# Patient Record
Sex: FEMALE | Race: WHITE
[De-identification: names, ages, dates, MRNs, and addresses within clinical notes are randomized per-mention and may not be internally consistent; named-entity substitution may affect disease eponyms.]

---

## 2017-07-05 ENCOUNTER — HOSPITAL ENCOUNTER (EMERGENCY)
Dept: HOSPITAL 74 - JERFT | Age: 57
Discharge: HOME | End: 2017-07-05
Payer: COMMERCIAL

## 2017-07-05 VITALS — HEART RATE: 73 BPM | TEMPERATURE: 98 F | DIASTOLIC BLOOD PRESSURE: 102 MMHG | SYSTOLIC BLOOD PRESSURE: 159 MMHG

## 2017-07-05 VITALS — BODY MASS INDEX: 29.6 KG/M2

## 2017-07-05 DIAGNOSIS — I10: ICD-10-CM

## 2017-07-05 DIAGNOSIS — J06.9: Primary | ICD-10-CM

## 2017-07-05 DIAGNOSIS — S92.534A: ICD-10-CM

## 2017-07-05 DIAGNOSIS — K58.9: ICD-10-CM

## 2017-07-05 PROCEDURE — 2W3UXYZ IMMOBILIZATION OF RIGHT TOE USING OTHER DEVICE: ICD-10-PCS

## 2017-07-05 NOTE — PDOC
History of Present Illness





- General


Chief Complaint: Sore Throat


Stated Complaint: THROAT PAIN, TOE PAIN


Time Seen by Provider: 07/05/17 12:13


History Source: Patient


Exam Limitations: No Limitations





- History of Present Illness


Initial Comments: 





07/05/17 13:21


CC SORE THROAT AND FRACTURED RIGHT SMALL TOE


Severity: mild


Associated Symptoms: reports: denies symptoms.  denies: cough





Past History





- Past Medical History


Allergies/Adverse Reactions: 


 Allergies











Allergy/AdvReac Type Severity Reaction Status Date / Time


 


linaclotide [From Linzess] AdvReac Intermediate diarrhea Unverified 07/05/17 12:

05











Home Medications: 


Ambulatory Orders





Amlodipine Besylate [Norvasc -] 10 mg PO DAILY 01/28/14 


Fluticasone Prop 0.05% Nasal [Flonase -] 1 spray NS BID #1 spray.pump 01/09/15 


Albuterol Sulfate Inhaler - [Ventolin HFA Inhaler -] 2 inh PO Q4H #1 inh NS 01/

26/15 


Azithromycin [Zithromax Z-KRISTA (5 DAYS) -] 250 mg PO ASDIR #6 tablet NS 01/26/15 


Salmeterol/Fluticasone [Advair 100Mcg/50Mcg -] 1 inh PO BID #1 diskus NS 01/26/

15 


Cholecalciferol (Vitamin D3) [Vitamin D3] 1,000 unit PO DAILY  capsule 05/22/15 








GI Disorders: Yes (IBS)


HTN: Yes





- Surgical History


Abdominal Surgery: Yes





- Psycho/Social/Smoking Cessation Hx


Anxiety: No


Suicidal Ideation: No


Smoking History: Never smoked


Hx Alcohol Use: Yes (SOCIAL)


Drug/Substance Use Hx: No


Substance Use Type: None





**Review of Systems





- Review of Systems


Constitutional: Yes: Fever, Malaise.  No: Symptoms Reported, Chills


HEENTM: Yes: Symptoms Reported, Nose Congestion, Throat Pain, Throat Swelling


Respiratory: No: Symptoms reported, Cough, Wheezing


Cardiac (ROS): No: Chest Pain


Musculoskeletal: Yes: Other (TENDER SMALL RIGHT TOE)





*Physical Exam





- Vital Signs


 Last Vital Signs











Temp Pulse Resp BP Pulse Ox


 


 98.0 F   73   20   159/102   97 


 


 07/05/17 12:02  07/05/17 12:02  07/05/17 12:02  07/05/17 12:02  07/05/17 12:02














- Physical Exam


General Appearance: Yes: Appropriately Dressed.  No: Apparent Distress


HEENT: positive: TMs Normal, Other (RED TROAT WITH POST NASAL DRIP)


Neck: positive: Supple, Lymphadenopathy (R), Lymphadenopathy (L).  negative: 

Rigid, Rigidity


Respiratory/Chest: positive: Lungs Clear.  negative: Chest Tender, Labored 

Respiration, Stridor, Wheezing


Musculoskeletal: positive: Other (TENDER RIGHT SMALL TOE WITH ECCHYMOSIS; NO 

GROSS DEFORMITY)


Integumentary: positive: Other (NO SKIN BREAKS TO TOE).  negative: Normal Color

, Dry, Warm





ED Treatment Course





- ADDITIONAL ORDERS


Additional order review: 














 07/05/17 12:07 Group A Strep Rapid Antigen - Final





 Throat 














Medical Decision Making





- Medical Decision Making





07/05/17 13:24


CHERELLE SPLINTED RIGHT SMALL TOE;; STREP= NEGATIVE





*DC/Admit/Observation/Transfer


Diagnosis at time of Disposition: 


 Viral upper respiratory tract infection





Toe fracture, right


Qualifiers:


 Encounter type: initial encounter Toe: lesser toe Fracture type: closed Phalanx

: distal Fracture alignment: nondisplaced Qualified Code(s): S92.534A - 

Nondisplaced fracture of distal phalanx of right lesser toe(s), initial 

encounter for closed fracture





- Discharge Dispostion


Disposition: HOME


Condition at time of disposition: Stable


Admit: No





- Patient Instructions


Additional Instructions: 


WEAR CHERELLE SPLINT; ADVIL FOR FEVER

## 2018-01-03 ENCOUNTER — HOSPITAL ENCOUNTER (OUTPATIENT)
Dept: HOSPITAL 74 - FASU-ENDO | Age: 58
Discharge: HOME | End: 2018-01-03
Attending: INTERNAL MEDICINE
Payer: COMMERCIAL

## 2018-01-03 VITALS — HEART RATE: 63 BPM | DIASTOLIC BLOOD PRESSURE: 74 MMHG | SYSTOLIC BLOOD PRESSURE: 104 MMHG

## 2018-01-03 VITALS — TEMPERATURE: 97.1 F

## 2018-01-03 VITALS — BODY MASS INDEX: 32.6 KG/M2

## 2018-01-03 DIAGNOSIS — Z86.010: Primary | ICD-10-CM

## 2018-01-03 DIAGNOSIS — K64.1: ICD-10-CM

## 2018-01-03 DIAGNOSIS — D12.2: ICD-10-CM

## 2018-01-03 DIAGNOSIS — K63.5: ICD-10-CM

## 2018-01-03 DIAGNOSIS — K57.30: ICD-10-CM

## 2018-01-03 PROCEDURE — 0DBL8ZX EXCISION OF TRANSVERSE COLON, VIA NATURAL OR ARTIFICIAL OPENING ENDOSCOPIC, DIAGNOSTIC: ICD-10-PCS | Performed by: INTERNAL MEDICINE

## 2018-01-03 PROCEDURE — 0DBK8ZX EXCISION OF ASCENDING COLON, VIA NATURAL OR ARTIFICIAL OPENING ENDOSCOPIC, DIAGNOSTIC: ICD-10-PCS | Performed by: INTERNAL MEDICINE

## 2018-01-05 NOTE — PATH
Surgical Pathology Report



Patient Name:  BRITTANI AJ

Accession #:  [id number]

Med. Rec. #:  [id number]                                                        

   /Age/Gender:  1960 (Age: 57) / F

Account:  [id number]                                                          

             Location: [facility]-ENDOSCOPY

Taken:  1/3/2018

Received:  1/3/2018

Reported:  2018

Physicians:  Madison Gomez M.D.

  



Specimen(s) Received

A: BX ASCENDING COLON 

B: BX SIGMOID 





Clinical History

Preoperative diagnosis: Rule out colon cancer, history of polyps

Postoperative diagnosis: Polyps







Final Diagnosis

A. ASCENDING COLON, BIOPSY:

TUBULAR ADENOMA.



B. SIGMOID COLON, BIOPSY:

HYPERPLASTIC POLYP.







***Electronically Signed***

Madison Mayers M.D.





Gross Description

A.  Received in formalin, labeled "ascending" is a tan, irregular portion of

soft tissue measuring 0.4 cm. in greatest dimension. The specimen is submitted

in toto in one cassette.



B.  Received in formalin, labeled "sigmoid" is a tan, irregular portion of soft

tissue measuring 0.5 cm. in greatest dimension. The specimen is submitted in

toto in one cassette.

/2018



saudi

## 2018-09-09 ENCOUNTER — HOSPITAL ENCOUNTER (INPATIENT)
Dept: HOSPITAL 74 - FER | Age: 58
LOS: 6 days | Discharge: HOME | DRG: 392 | End: 2018-09-15
Attending: NURSE PRACTITIONER | Admitting: INTERNAL MEDICINE
Payer: COMMERCIAL

## 2018-09-09 VITALS — BODY MASS INDEX: 32.8 KG/M2

## 2018-09-09 DIAGNOSIS — I10: ICD-10-CM

## 2018-09-09 DIAGNOSIS — D72.829: ICD-10-CM

## 2018-09-09 DIAGNOSIS — K57.90: ICD-10-CM

## 2018-09-09 DIAGNOSIS — K21.9: ICD-10-CM

## 2018-09-09 DIAGNOSIS — K58.9: ICD-10-CM

## 2018-09-09 DIAGNOSIS — R10.32: ICD-10-CM

## 2018-09-09 DIAGNOSIS — K52.9: Primary | ICD-10-CM

## 2018-09-09 DIAGNOSIS — J90: ICD-10-CM

## 2018-09-09 DIAGNOSIS — E78.00: ICD-10-CM

## 2018-09-09 DIAGNOSIS — R11.2: ICD-10-CM

## 2018-09-09 LAB
ALBUMIN SERPL-MCNC: 4.7 G/DL (ref 3.5–5)
ALP SERPL-CCNC: 61 U/L (ref 32–92)
ALT SERPL-CCNC: 23 U/L (ref 10–40)
ANION GAP SERPL CALC-SCNC: 9 MMOL/L (ref 8–16)
AST SERPL-CCNC: 28 U/L (ref 10–42)
BASOPHILS # BLD: 4.6 % (ref 0–2)
BILIRUB SERPL-MCNC: 1.2 MG/DL (ref 0.2–1)
BUN SERPL-MCNC: 18 MG/DL (ref 7–18)
CALCIUM SERPL-MCNC: 9.5 MG/DL (ref 8.4–10.2)
CHLORIDE SERPL-SCNC: 101 MMOL/L (ref 98–107)
CO2 SERPL-SCNC: 26 MMOL/L (ref 22–28)
CREAT SERPL-MCNC: 1 MG/DL (ref 0.6–1.3)
DEPRECATED RDW RBC AUTO: 11.9 % (ref 11.6–15.6)
EOSINOPHIL # BLD: 0.9 % (ref 0–4.5)
GLUCOSE SERPL-MCNC: 102 MG/DL (ref 74–106)
HCT VFR BLD CALC: 41.3 % (ref 32.4–45.2)
HGB BLD-MCNC: 13.7 GM/DL (ref 10.7–15.3)
LIPASE SERPL-CCNC: 77 U/L (ref 73–393)
LYMPHOCYTES # BLD: 10 % (ref 8–40)
MCH RBC QN AUTO: 30.4 PG (ref 25.7–33.7)
MCHC RBC AUTO-ENTMCNC: 33.3 G/DL (ref 32–36)
MCV RBC: 91.4 FL (ref 80–96)
MONOCYTES # BLD AUTO: 6.8 % (ref 3.8–10.2)
NEUTROPHILS # BLD: 77.7 % (ref 42.8–82.8)
PLATELET # BLD AUTO: 268 K/MM3 (ref 134–434)
PMV BLD: 7.8 FL (ref 7.5–11.1)
POTASSIUM SERPLBLD-SCNC: 3.6 MMOL/L (ref 3.5–5.1)
PROT SERPL-MCNC: 7.3 G/DL (ref 6.4–8.3)
RBC # BLD AUTO: 4.52 M/MM3 (ref 3.6–5.2)
SODIUM SERPL-SCNC: 136 MMOL/L (ref 136–145)
WBC # BLD AUTO: 16.1 K/MM3 (ref 4–10.8)

## 2018-09-09 RX ADMIN — HYDROMORPHONE HYDROCHLORIDE PRN MG: 2 INJECTION, SOLUTION INTRAMUSCULAR; INTRAVENOUS; SUBCUTANEOUS at 21:33

## 2018-09-09 NOTE — HP
CHIEF COMPLAINT: Abd pain





PCP: Sourav GI: Patricia





HISTORY OF PRESENT ILLNESS:


This is a 58 year old female with a past medical history of diverticulosis and 

HTN who presented to the ED with LLQ pain. Pt reports the pain began yesterday 

after dinner. She had sudden onset of N/V/D associated with the pain. She 

reports hematochezia x 1 episode. Last colonoscopy 2018.





ER course was notable for:


(1) WBC 16.1


(2) CT c/w infectious colitis





Recent Travel:


pt denies





PAST MEDICAL HISTORY:


diverticulosis


HTN


PAST SURGICAL HISTORY:


 x 2





Social History:


Smoking: pt denies


Alcohol: pt denies


Drugs: pt denies





Family History:


mother alive, h/o BrCA, OA


father  age 83, prostate CA, h/o CABG


brother alive with CAD, HLD, HTN





Allergies


linaclotide [From Linzess] Adverse Reaction (Intermediate, Verified 18 15:

32)


 diarrhea





HOME MEDICATIONS:


 3





 Medication  Instructions  Recorded


 


Esomeprazole Magnesium [Nexium 20 mg PO DAILY 18





24Hr]  


 


Ramipril 5 mg PO daily


10 mg PO HS 09/10/18


 


hydrochlorothiazide 12.5 mg PO daily 








REVIEW OF SYSTEMS


CONSTITUTIONAL: 


Absent:  fever, chills, diaphoresis, generalized weakness, malaise, loss of 

appetite, weight change


HEENT: 


Absent:  rhinorrhea, nasal congestion, throat pain, throat swelling, difficulty 

swallowing, mouth swelling, ear pain, eye pain, visual changes


CARDIOVASCULAR: 


Absent: chest pain, syncope, palpitations, irregular heart rate, lightheadedness

, peripheral edema


RESPIRATORY: 


Absent: cough, shortness of breath, dyspnea with exertion, orthopnea, wheezing, 

stridor, hemoptysis


GASTROINTESTINAL: Present: abdominal pain, nausea, vomiting, diarrhea


Absent: abdominal distension, constipation, melena, hematochezia


GENITOURINARY: 


Absent: dysuria, frequency, urgency, hesitancy, hematuria, flank pain, genital 

pain


MUSCULOSKELETAL: 


Absent: myalgia, arthralgia, joint swelling, back pain, neck pain


SKIN: 


Absent: rash, itching, pallor


HEMATOLOGIC/IMMUNOLOGIC: 


Absent: easy bleeding, easy bruising, lymphadenopathy, frequent infections


ENDOCRINE:


Absent: unexplained weight gain, unexplained weight loss, heat intolerance, 

cold intolerance


NEUROLOGIC: 


Absent: headache, focal weakness or paresthesias, dizziness, unsteady gait, 

seizure, mental status changes, bladder or bowel incontinence


PSYCHIATRIC: 


Absent: anxiety, depression, suicidal or homicidal ideation, hallucinations.








PHYSICAL EXAMINATION


Vital Signs - 24 hr





 3





  18





  13:41 17:19 19:36


 


Temperature 98.8 F 98.1 F 98.9 F


 


Pulse Rate 94 H  87


 


Pulse Rate [  86 





Right]   


 


Respiratory 20 20 18





Rate   


 


Blood Pressure 141/84  126/83


 


Blood Pressure  129/80 





[Left Arm]   


 


O2 Sat by Pulse 100 97 97





Oximetry (%)   








GENERAL: Awake, alert, and fully oriented, in no acute distress.


HEAD: Normal with no signs of trauma.


EYES: Pupils equal, round and reactive to light, extraocular movements intact, 

sclera anicteric, conjunctiva clear. No lid lag.


EARS, NOSE, THROAT: Ears normal, nares patent, oropharynx clear without 

exudates. Moist mucous membranes.


NECK: Normal range of motion, supple without lymphadenopathy, JVD, or masses.


LUNGS: Breath sounds equal, clear to auscultation bilaterally. No wheezes, and 

no crackles. No accessory muscle use.


HEART: Regular rate and rhythm, normal S1 and S2 without murmur, rub or gallop.


ABDOMEN: Soft, Tender LLQ, LUQ, not distended, normoactive bowel sounds, no 

guarding, no rebound, no masses.  No hepatomegaly or  splenomegaly. 


MUSCULOSKELETAL: Normal range of motion at all joints. No bony deformities or 

tenderness. No CVA tenderness.


UPPER EXTREMITIES: 2+ pulses, warm, well-perfused. No cyanosis. No clubbing. No 

peripheral edema.


LOWER EXTREMITIES: 2+ pulses, warm, well-perfused. No calf tenderness. No 

peripheral edema. 


NEUROLOGICAL:  Cranial nerves II-XII intact. Normal speech. Normal gait.


PSYCHIATRIC: Cooperative. Good eye contact. Appropriate mood and affect.


SKIN: Warm, dry, normal turgor, no rashes or lesions noted, normal capillary 

refill. 





Laboratory Results - last 24 hr





 3





  18





  14:46 14:46 19:10


 


WBC  16.1 H  


 


RBC  4.52  


 


Hgb  13.7  


 


Hct  41.3  


 


MCV  91.4  


 


MCH  30.4  


 


MCHC  33.3  


 


RDW  11.9  


 


Plt Count  268  


 


MPV  7.8  


 


Absolute Neuts (auto)  12.6  


 


Neutrophils %  77.7  


 


Lymphocytes %  10.0  


 


Monocytes %  6.8  


 


Eosinophils %  0.9  


 


Basophils %  4.6 H  


 


Sodium   136 


 


Potassium   3.6 


 


Chloride   101 


 


Carbon Dioxide   26 


 


Anion Gap   9 


 


BUN   18 


 


Creatinine   1.0 


 


Creat Clearance w eGFR   57.35 


 


Random Glucose   102 


 


Lactic Acid    1.5


 


Calcium   9.5 


 


Total Bilirubin   1.2 H 


 


AST   28  D 


 


ALT   23  D 


 


Alkaline Phosphatase   61 


 


Total Protein   7.3 


 


Albumin   4.7 


 


Lipase   77 








Radiology Reports


CT abd/pelvis


THIS IS A PRELIMINARY REPORT FROM IMAGING ON CALL


FINDINGS:


Lower lung fields are clear. There are no gallstones identified. Liver is fatty 

in density without mass


or biliary dilatation.


The pancrease, spleen, adrenal glands are within normal. There are no renal 

stones, mass or


obstructive uropathy.


Abdominal aorta without AAA or dissection. IVC patent. There is no 

retroperitoneal hemorrhage or


pathologic adenopathy.


The appendix is normal . Transmural bowel edema with subtle pericolonic 

inflammatory fatty


stranding involves the descending colon from the splenic flexure to the 

sigmoid. Extensive sigmoid


diverticulosis noted. .No evidence of bowel obstruction, ascites, abscess, free 

air Bladder and


uterus grossly within normal


There is no pelvic mass or pathologic adenopathy. Spine and bony pelvis without 

fracture or


suspicious lesion.


IMPRESSION:


Left colitis without abscess or bowel obstruction.


Appendix normal.


THIS DOCUMENT HAS BEEN ELECTRONICALLY SIGNED


Skyla Reis D.O.


2018 17:36 EST








ASSESSMENT/PLAN:


58yF with PMH diverticulosis and HTN presented to the ED with LLQ pain, N/V/D.





Colitis


   - levaquin 750 daily


   - flagyl 500 q8h


   - GI consult appreciated


   - stool for culture, c diff


   - NPO for now given tenderness, advance diet as tolerated


   - NS @100cc/hr





HTN   


   - bp stable, resume meds as soon as diet initiated or sooner if BP elevated





DVT PPX


   - deferred given reported episode hematochezia





FEN


   - NS @ 100cc/hr


   - BMP in am


   - NPO for now





Dispo: pt currently requires further inpatient management of her emergent 

condition.








Visit type





- Emergency Visit


Emergency Visit: Yes


ED Registration Date: 18


Care time: The patient presented to the Emergency Department on the above date 

and was hospitalized for further evaluation of their emergent condition.





- New Patient


This patient is new to me today: Yes


Date on this admission: 18





- Critical Care


Critical Care patient: No





Hospitalist Screening





- Colonoscopy Questionnaire


Colonoscopy Questionnaire: 





Colonoscopy Questionnaire








-   Patient:


50 - 75 years old and never had a screening colonoscopy: No


History of colon or rectal polyps, or CA: No


History of IBD, Crohn's disease or UC: No


History of abdominal radiation therapy as a child: No





-   Relative:


1 with colon or rectal CA, or polyps at age 60 or younger: No


Colon or rectal CA diagnosed at age 45 or younger: No


Multiple relatives with colon or rectal CA: No





-   Outcome:


Screening Result: Negative Screen

## 2018-09-09 NOTE — PDOC
History of Present Illness





- General


Chief Complaint: Diarrhea


Stated Complaint: N/V/D


Time Seen by Provider: 18 13:43


History Source: Patient, Spouse


Exam Limitations: No Limitations





- History of Present Illness


Initial Comments: 





18 14:08


CHIEF COMPLAINT: Left lower quadrant abdominal pain since last night





HISTORY OF PRESENT ILLNESS: 58-year-old female with a history of hypertension 

and diverticulosis without documented diverticulitis presents complaining of 

onset last night of abdominal pain.  Patient states they had a party and she 

ate several things that she doesn't normally eat.  She had positive with tomato 

sauce, cheese, red wine, and after the party she started to have abdominal 

pain.  She had nausea with vomiting, reddish in color like the tomato sauce 

that she ate.  She also had 2 hours of ongoing episodes of diarrhea, soft and 

then watery.  The diarrhea was also pinkish in color.  There was no black coffee

-ground emesis and no black stool.  Patient also had an episode of chills.  She 

took Advil with some relief but she continues to have left lower quadrant pain 

and a sensation of distention of her entire abdomen.  She came to the ED for 

further evaluation.  Of note she had a colonoscopy in January of this year 

which revealed polyps which were removed.  Her gastroenterologist is Dr. Maidson Gomez.





REVIEW OF SYSTEMS:


GENERAL/CONSTITUTIONAL: No fever, positive chills.  No weakness.  No recent 

weight change.


HEAD, EYES, EARS, NOSE AND THROAT: No change in vision. No ear pain or 

discharge.  + Positive chronic throat symptoms secondary to GERD.


CARDIOVASCULAR: No chest pain or shortness of breath.


RESPIRATORY: No cough, wheezing, or hemoptysis.


GASTROINTESTINAL: + Positive nausea and vomiting.  + Positive diarrhea.  + 

Positive left lower quadrant abdominal pain.


GENITOURINARY: No dysuria, frequency, or change in urination.


MUSCULOSKELETAL: No joint or muscle swelling or pain. No neck or back pain.


SKIN AND BREASTS: No rash or easy bruising.


NEUROLOGIC: No headache, vertigo, loss of consciousness, or loss of sensation.


PSYCHIATRIC: No depression or anxiety.


ENDOCRINE: No increased thirst. No abnormal weight change.


HEMATOLOGIC/LYMPHATIC: No anemia, easy bleeding, or history of blood clots.


ALLERGIC/IMMUNOLOGIC: No hives or skin allergy. No latex allergy.














Past History





- Past Medical History


Allergies/Adverse Reactions: 


 Allergies











Allergy/AdvReac Type Severity Reaction Status Date / Time


 


linaclotide [From Linzess] AdvReac Intermediate diarrhea Verified 18 15:32











Home Medications: 


Ambulatory Orders





Esomeprazole Magnesium [Nexium 24Hr] 20 mg PO DAILY 18 








Anemia: No


Asthma: No


Cancer: No


Cardiac Disorders: No


CVA: No


COPD: No


CHF: No


Dementia: No


Diabetes: No


GI Disorders: Yes (IBS, GERD)


 Disorders: No


HTN: Yes (ON MEDICATION)


Hypercholesterolemia: Yes


Liver Disease: No


Seizures: No


Thyroid Disease: No





- Surgical History


Abdominal Surgery: Yes ( 2)


Appendectomy: No


Cardiac Surgery: No


Cholecystectomy: No


Lung Surgery: No


Neurologic Surgery: No


Orthopedic Surgery: No





- Suicide/Smoking/Psychosocial Hx


Smoking History: Never smoked


Have you smoked in the past 12 months: No


Hx Alcohol Use: Yes (SOCIAL)


Drug/Substance Use Hx: No


Substance Use Type: None





*Physical Exam





- Physical Exam


Comments: 





18 14:12


GENERAL: The patient is awake, alert, and fully oriented, in no acute distress.

  She appears a little uncomfortable due to abdominal pain.


HEAD: Normal with no signs of trauma.


EYES: Pupils equal, round and reactive to light, extraocular movements intact, 

sclera anicteric, conjunctiva clear.


ENT: Ears normal, nares patent, oropharynx clear without exudates.  Moist 

mucous membranes.


NECK: Normal range of motion, supple without lymphadenopathy, JVD, or masses.


LUNGS: Breath sounds equal, clear to auscultation bilaterally.  No wheezes, and 

no crackles.


HEART: Regular rate and rhythm, normal S1 and S2 without murmur, rub or gallop.


ABDOMEN: Very soft.  + Positive tender on palpation in the left lower quadrant.

  Remainder of the abdomen is nontender.  No masses.  No rebound tenderness.  

No guarding.  Bowel sounds are present.


EXTREMITIES: Normal range of motion, no edema.  No clubbing or cyanosis. No 

cords, erythema, or tenderness.


NEUROLOGICAL: Cranial nerves II through XII grossly intact.  Normal speech, 

normal gait.


PSYCH: Normal mood, normal affect.


SKIN: Warm, Dry, normal turgor, no rashes or lesions noted.





ED Treatment Course





- LABORATORY


CBC & Chemistry Diagram: 


 18 14:46





 18 14:46





- RADIOLOGY


Radiology Studies Ordered: 














 Category Date Time Status


 


 ABDOMEN & PELVIS CT WITH CONTR [CT] Stat CT Scan  18 14:07 Ordered














Medical Decision Making





- Medical Decision Making





18 14:14


58 old female with history of hypertension and colonic polyps as well as 

diverticulosis.  Patient also has a "sensitive stomach".  She gets intermittent 

attacks of abdominal symptoms.  Patient was feeling well until last night after 

she ate some of the things that she normally avoids.  She developed nausea, 

vomiting, and diarrhea.  She is now having left lower quadrant pain.  She spoke 

to her gastroenterologist by phone and was advised to come to the emergency 

department for further evaluation.





On examination, patient is afebrile.  Abdomen is notable for moderate 

tenderness in the left lower quadrant without guarding or rebound tenderness.





Impression: Rule out diverticulitis.  Rule out flare of irritable bowel 

syndrome.  Rule out other acute pathology.





Plan: IV hydration, Zofran, laboratory workup, urinalysis, CT scan of the 

abdomen and pelvis with intravenous contrast.  Further plans pending results.








18 18:24


CT scan shows thickening of the descending colon with pericolonic fluid 

collection.  White blood cell count is significantly elevated to 16.1.  Patient 

with waves of severe pain requiring IV Dilantin 2 doses for pain control.





Impression: Severe colitis.  Patient had blood cultures done and IV antibiotics 

started.  Lactate acid level is pending.  IV hydration continued.  Dr. Mcneil

, hospitalist notified, will admit the patient.  Dr. Gomez, gastroenterology 

physician has been paged for consult as she follows the patient.


18 18:26








*DC/Admit/Observation/Transfer


Diagnosis at time of Disposition: 


 Bacterial colitis








- Discharge Dispostion


Condition at time of disposition: Stable


Decision to Admit order: Yes


Decision to Admit order Date/Time: 





18 18:27


endorsed to Dr. Mcneil





- Referrals





- Patient Instructions





- Post Discharge Activity

## 2018-09-09 NOTE — CON.GI
Consult


Consult Specialty:: Gastroenterology 





- History of Present Illness


Chief Complaint: abdominal pain


History of Present Illness: 





Mrs. Jones is a 58 year old woman who is known to me from the outpt setting 

with a past medical history of IBS who presents to the ER with complaints of a 

sudden onset of lower abdominal pain, nausea, vomiting, diarrhea followed by an 

episode of hematochezia. She describes the pain as crampy in nature without 

radiation.  She states she was feeling fine yesterday and had guests at her 

home for dinner. She ate pasta - which she prepared. She has not traveled and 

did not eat any culprit foods. She denies fever, chills, melena,  previous 

episodes.


Last c-scope by me within the last 6 months. 





- History Source


History Provided By: Patient


Limitations to Obtaining History: No Limitations





- Past Medical History


CNS: No: Alzheimer's, CVA, Dementia, Migraine, Multiple Sclerosis, Peripheral 

Neuropathy, Parkinson's, Seizure, Syncope, TIA, Vertigo, Other


Cardio/Vascular: No: AFIB, Aneurysm, Aortic Insufficiency, Aortic Stenosis, CAD

, CHF, Deep Vein Thrombosis, HTN, Hyperlipdemia, MI, Mitral Insufficiency, 

Mitral Stenosis, Murmur, Pulmonary Hypertension, Other


Pulmonary: No: Asthma, Bronchitis, Cancer, COPD, O2 Dependent, Pneumonia, 

Previously Intubated, Pulmonary Embolus, Pulmonary Fibrosis, Sleep Apnea, Other


Gastrointestinal: Yes: GERD, Irritable Bowel Disease


Hepatobiliary: No: Cirrhosis, Cholelithiasis, Cholecystitis, Choledocholithiasis

, Hepatitis A, Hepatitis B, Hepatitis C, Other


Renal/: No: Renal Failure, Renal Inusuff, BPH, Cancer, Hematuria, Hemodialysis

, Neurogenic Bladder, Renal Calculi, UTI, Other


Reproductive: No: Ectopic Pregnancy, Endometriosis, Fibroids, PID, Polycystic 

Ovary Syndrome, Postmenopausal, Other


...Pregnant: No





- Alcohol/Substance Use


Hx Alcohol Use: Yes (SOCIAL)





- Smoking History


Smoking history: Never smoked


Have you smoked in the past 12 months: No





Home Medications





- Allergies


Allergies/Adverse Reactions: 


 Allergies











Allergy/AdvReac Type Severity Reaction Status Date / Time


 


linaclotide [From Linzess] AdvReac Intermediate diarrhea Verified 09/09/18 15:32














- Home Medications


Home Medications: 


Ambulatory Orders





Esomeprazole Magnesium [Nexium 24Hr] 20 mg PO DAILY 09/09/18 











Family Disease History





- Family Disease History


Family History: Unremarkable





Review of Systems


Unable to obtain ROS, reason: as per HPI 





- Review of Systems


Constitutional: reports: Weakness


Eyes: denies: No Symptoms, Blind Spots, Blurred Vision, Double Vision, Eye Pain

, Floaters, Photophobia, Recent Change in Vision, Other


HENT: denies: No Symptoms, Difficult Swallowing, Ear Discharge, Ear Pain, 

Epistaxis, Gingival Bleeding, Hearing Loss, Mouth Swelling, Nasal Congestion, 

Ocular Prosthesis, Throat Pain, Toothache, Ringing in Ears, Other


Neck: denies: No Symptoms, Decreased ROM, Lumps, Pain on Movement, Stiffness, 

Swollen Glands, Tenderness, Other


Cardiovascular: denies: No Symptoms, Chest Pain, Edema, Palpitations, Shortness 

of Breath, Other


Respiratory: denies: No Symptoms, Cough, Exercise Intolerance, Hemoptysis, 

Orthopnea, PND, Snoring, SOB, SOB on Exertion, Wheezing, Other


Gastrointestinal: reports: Other (as per HPI)


Genitourinary: denies: No Symptoms, Burning, Discharge, Dysuria, Flank Pain, 

Frequency, Hematuria, Incontinence, Lesions, Menses, Pain, Testicular Mass, 

Testicular Pain, Testicular Swelling, Urgency, Vaginal Bleeding, Other





Physical Exam-GI


Vital Signs: 


 Vital Signs











Temperature  98.9 F   09/09/18 19:36


 


Pulse Rate  87   09/09/18 19:36


 


Respiratory Rate  18   09/09/18 19:36


 


Blood Pressure  126/83   09/09/18 19:36


 


O2 Sat by Pulse Oximetry (%)  98   09/09/18 20:10











Constitutional: Yes: Well Nourished, No Distress


Eyes: Yes: WNL


HENT: Yes: WNL


Neck: Yes: WNL


Cardiovascular: Yes: WNL, Regular Rate and Rhythm, S1, S2


Respiratory: Yes: WNL, Regular, CTA Bilaterally


Gastrointestinal Inspection: Yes: WNL, Other (recieved pain meds - not tender 

on my examination , soft with nml BS)


...Auscultate: Yes: Normoactive Bowel Sounds


Labs: 


 CBC, BMP





 09/09/18 14:46 





 09/09/18 14:46 











Imaging





- Results


Cat Scan: Pending





Problem List





- Problems


(1) Colitis


Code(s): K52.9 - NONINFECTIVE GASTROENTERITIS AND COLITIS, UNSPECIFIED   





Assessment/Plan





Impression: acute onset of diarrhea / nausea/ vomiting / hematochezia with an 

associated leukocytosis most likely secondary to infectious colitis. 





REC:


trial of clear liquid diet 


c/w IVF's 


levaquin 500 mg IV qd 


flagyl 500 mg IV Q8 (7 day course of abx)


obtsin stool culture / ova / parasite / leukocyte / c.diff pcr. 


As she improves her diet can be advanced to a full liquid diet then low residue 

lactose free diet Problem:  Patient Care Overview (Adult)  Goal: Plan of Care Review    03/04/17 0541 03/05/17 0346   Coping/Psychosocial Response Interventions   Plan Of Care Reviewed With --  patient   Coping/Psychosocial   Patient Agreement with Plan of Care --  agrees   Patient Care Overview   Progress no change --    Outcome Evaluation   Outcome Summary/Follow up Plan --  PT has appeared to rest well during this shift. During contact with RN, pt was labile, sometimes speaking somewhat calmly, other times yelling loudly. She declined to participate in most parts of assessment, and simply repeated questions back to RN and then yelled. Her concentration appears significantly impaired. Pt does not answer when asked about internal stimuli or hallucinations, but seems to be experiencing these, perhaps intermittently. Continuing to monitor and suppr.t       Goal: Individualization and Mutuality  Outcome: Ongoing (interventions implemented as appropriate)    03/05/17 0346   Behavioral Health Screens   Patient Personal Strengths family/social support         Problem:  Overarching Goals  Goal: Adheres to Safety Considerations for Self and Others  Intervention: Develop/maintain Individualized Safety Plan    03/05/17 0346   Safety   Safety Measures safety rounds completed;environmental rounds completed   Mental Health Homicide Risk   Homicidal Ideation no   Provide Emotional/Physical Safety   Suicidal Ideation no         Goal: Optimized Coping Skills in Response to Life Stressors  Intervention: Promote Effective Coping Strategies    03/05/17 0346   Coping Strategies   Supportive Measures active listening utilized;verbalization of feelings encouraged

## 2018-09-10 LAB
ANION GAP SERPL CALC-SCNC: 6 MMOL/L (ref 8–16)
APPEARANCE UR: CLEAR
BILIRUB UR STRIP.AUTO-MCNC: NEGATIVE MG/DL
BUN SERPL-MCNC: 10 MG/DL (ref 7–18)
CALCIUM SERPL-MCNC: 8.4 MG/DL (ref 8.4–10.2)
CHLORIDE SERPL-SCNC: 106 MMOL/L (ref 98–107)
CO2 SERPL-SCNC: 24 MMOL/L (ref 22–28)
COLOR UR: (no result)
CREAT SERPL-MCNC: 0.8 MG/DL (ref 0.6–1.3)
DEPRECATED RDW RBC AUTO: 11.6 % (ref 11.6–15.6)
EPITH CASTS URNS QL MICRO: (no result) /HPF
GLUCOSE SERPL-MCNC: 125 MG/DL (ref 74–106)
HCT VFR BLD CALC: 35.8 % (ref 32.4–45.2)
HGB BLD-MCNC: 11.6 GM/DL (ref 10.7–15.3)
KETONES UR QL STRIP: NEGATIVE
LEUKOCYTE ESTERASE UR QL STRIP.AUTO: NEGATIVE
MAGNESIUM SERPL-MCNC: 1.7 MG/DL (ref 1.8–2.4)
MCH RBC QN AUTO: 29.4 PG (ref 25.7–33.7)
MCHC RBC AUTO-ENTMCNC: 32.3 G/DL (ref 32–36)
MCV RBC: 91.2 FL (ref 80–96)
MUCOUS THREADS URNS QL MICRO: (no result)
NITRITE UR QL STRIP: NEGATIVE
PH UR: 5 [PH] (ref 5–8)
PHOSPHATE SERPL-MCNC: 2.4 MG/DL (ref 2.5–4.6)
PLATELET # BLD AUTO: 205 K/MM3 (ref 134–434)
PLATELET BLD QL SMEAR: ADEQUATE
PMV BLD: 7.4 FL (ref 7.5–11.1)
POTASSIUM SERPLBLD-SCNC: 3.4 MMOL/L (ref 3.5–5.1)
PROT UR QL STRIP: NEGATIVE
PROT UR QL STRIP: NEGATIVE
RBC # BLD AUTO: 3.93 M/MM3 (ref 3.6–5.2)
SODIUM SERPL-SCNC: 136 MMOL/L (ref 136–145)
SP GR UR: 1.04 (ref 1–1.03)
UROBILINOGEN UR STRIP-MCNC: NEGATIVE MG/DL (ref 0.2–1)
WBC # BLD AUTO: 15.3 K/MM3 (ref 4–10.8)

## 2018-09-10 RX ADMIN — PIPERACILLIN AND TAZOBACTAM SCH MLS/HR: 4; .5 INJECTION, POWDER, LYOPHILIZED, FOR SOLUTION INTRAVENOUS at 10:40

## 2018-09-10 RX ADMIN — Medication SCH TAB: at 14:25

## 2018-09-10 RX ADMIN — HYDROMORPHONE HYDROCHLORIDE PRN MG: 2 INJECTION, SOLUTION INTRAMUSCULAR; INTRAVENOUS; SUBCUTANEOUS at 02:00

## 2018-09-10 RX ADMIN — HYDROMORPHONE HYDROCHLORIDE PRN MG: 2 INJECTION, SOLUTION INTRAMUSCULAR; INTRAVENOUS; SUBCUTANEOUS at 09:45

## 2018-09-10 RX ADMIN — PIPERACILLIN AND TAZOBACTAM SCH MLS/HR: 4; .5 INJECTION, POWDER, LYOPHILIZED, FOR SOLUTION INTRAVENOUS at 17:35

## 2018-09-10 RX ADMIN — PANTOPRAZOLE SODIUM SCH MG: 40 INJECTION, POWDER, FOR SOLUTION INTRAVENOUS at 14:25

## 2018-09-10 RX ADMIN — ENOXAPARIN SODIUM SCH: 40 INJECTION SUBCUTANEOUS at 09:50

## 2018-09-10 RX ADMIN — HYDROMORPHONE HYDROCHLORIDE PRN MG: 2 INJECTION, SOLUTION INTRAMUSCULAR; INTRAVENOUS; SUBCUTANEOUS at 05:55

## 2018-09-10 RX ADMIN — HYDROMORPHONE HYDROCHLORIDE PRN MG: 2 INJECTION, SOLUTION INTRAMUSCULAR; INTRAVENOUS; SUBCUTANEOUS at 22:07

## 2018-09-10 RX ADMIN — HYDROMORPHONE HYDROCHLORIDE PRN MG: 2 INJECTION, SOLUTION INTRAMUSCULAR; INTRAVENOUS; SUBCUTANEOUS at 14:20

## 2018-09-10 RX ADMIN — POTASSIUM CHLORIDE, DEXTROSE MONOHYDRATE AND SODIUM CHLORIDE SCH MLS/HR: 150; 5; 900 INJECTION, SOLUTION INTRAVENOUS at 10:18

## 2018-09-10 NOTE — PN
Progress Note (short form)





- Note


Progress Note: 





ID Consult dictated


Colitis R/O enteric pathogens


Leukocytosis R/O sepsis


Await blood c/s, stool studies


Continue empiric zosyn/ flagyl

## 2018-09-10 NOTE — PN
Progress Note, Physician


Chief Complaint: 





feeling better ; loose stool with blood earlier today 


not eating still on ice chips 


pain controlled with meds 





- Current Medication List


Current Medications: 


Active Medications





Enoxaparin Sodium (Lovenox -)  40 mg SQ DAILY [facility]


   Last Admin: 09/10/18 09:50 Dose:  Not Given


Hydromorphone HCl (Dilaudid Injection -)  1 mg IVPUSH Q4H PRN


   PRN Reason: PAIN LEVEL 7 - 10


   Last Admin: 09/10/18 14:20 Dose:  1 mg


Metronidazole (Flagyl 250mg Premixed Ivpb -)  250 mg in 50 mls @ 50 mls/hr IVPB 

Q8H-IV ERLIN


   Last Admin: 09/10/18 17:35 Dose:  50 mls/hr


Dextrose/Sodium Chloride (Dextrose 5%-Normal Saline+20 Meq Kcl -)  20 meq in 1,

000 mls @ 100 mls/hr IV ASDIR ERLIN


   Last Admin: 09/10/18 10:18 Dose:  100 mls/hr


Piperacillin Sod/Tazobactam Sod (Zosyn 4.5gm Ivpb (Pre-Docked))  4.5 gm in 100 

mls @ 200 mls/hr IVPB Q8H-IV ERLIN; Protocol


   Last Admin: 09/10/18 17:35 Dose:  200 mls/hr


Lactobacillus Acidophilus (Bacid -)  1 tab PO DAILY [facility]


   Last Admin: 09/10/18 14:25 Dose:  1 tab


Ondansetron HCl (Zofran Injection)  4 mg IVPUSH Q6H PRN


   PRN Reason: NAUSEA


Pantoprazole Sodium (Protonix Iv)  40 mg IVPUSH DAILY [facility]


   Last Admin: 09/10/18 14:25 Dose:  40 mg











- Objective


Vital Signs: 


 Vital Signs











Temperature  99.7 F H  09/10/18 18:00


 


Pulse Rate  88   09/10/18 18:00


 


Respiratory Rate  18   09/10/18 18:00


 


Blood Pressure  120/73   09/10/18 18:00


 


O2 Sat by Pulse Oximetry (%)  94 L  09/10/18 18:00











Constitutional: Yes: Well Nourished


Eyes: Yes: WNL


HENT: Yes: WNL


Neck: Yes: WNL


Cardiovascular: Yes: WNL


Respiratory: Yes: WNL


Gastrointestinal: Yes: WNL, Normal Bowel Sounds, Soft


Genitourinary: Yes: WNL


Musculoskeletal: Yes: WNL


Extremities: Yes: WNL


Edema: No


Labs: 


 CBC, BMP





 09/10/18 07:30 





 09/10/18 07:30 











Problem List





- Problems


(1) Colitis


Code(s): K52.9 - NONINFECTIVE GASTROENTERITIS AND COLITIS, UNSPECIFIED   





Assessment/Plan








AXR ordered 


c/w IVF's 


trial of clear liquid diet tomorrow 


trend h/h qd while hospitalized 


zosyn 


f/u  stool culture / ova / parasite / leukocyte / c.diff pcr -- results are 

pending

## 2018-09-10 NOTE — PN
Physical Exam: 


SUBJECTIVE: Patient seen and examined, Reports feeling tired with ongoing 

abdominal pain describes the pain as a sharp stabbing sensation to the left 

lower quadrant, reports no appetite denies any tactile fevers








OBJECTIVE:Patient is a 58-year-old female, with a past medical history of 

hypertension and diverticulosis. patient was admitted from the emergency 

department for infectious colitis, 





 Vital Signs











 Period  Temp  Pulse  Resp  BP Sys/Hernandez  Pulse Ox


 


 Last 24 Hr  98.1 F-99.8 F  86-94  18-20  118-141/76-84  











GENERAL: The patient is awake, alert, and fully oriented, in no acute distress.


HEAD: Normal with no signs of trauma.


EYES: PERRL, extraocular movements intact, sclera anicteric, conjunctiva clear. 

No ptosis. 


ENT: Ears normal, nares patent, oropharynx clear without exudates, moist mucous 


membranes.


NECK: Trachea midline, full range of motion, supple. 


LUNGS: Breath sounds equal, clear to auscultation bilaterally, no wheezes, no 

crackles, no 


accessory muscle use. 


HEART: Regular rate and rhythm, S1, S2 without murmur, rub or gallop.


ABDOMEN: Soft,Positive guarding and tenderness to left lower quadrant,  

nondistended, normoactive bowel sounds, no hepatosplenomegaly, no masses.


EXTREMITIES: 2+ pulses, warm, well-perfused, no edema. 


NEUROLOGICAL: Cranial nerves II through XII grossly intact. Normal speech, gait 

not 


observed.


PSYCH: Normal mood, normal affect.


SKIN: Warm, dry, normal turgor, no rashes or lesions noted














 Laboratory Results - last 24 hr








 CBC











WBC  15.3 K/mm3 (4.0-10.8)  H  09/10/18  07:30    


 


RBC  3.93 M/mm3 (3.60-5.2)   09/10/18  07:30    


 


Hgb  11.6 GM/dl (10.7-15.3)   09/10/18  07:30    


 


Hct  35.8 % (32.4-45.2)   09/10/18  07:30    


 


MCV  91.2 fl (80-96)   09/10/18  07:30    


 


MCH  29.4 pg (25.7-33.7)   09/10/18  07:30    


 


MCHC  32.3 g/dl (32.0-36.0)   09/10/18  07:30    


 


RDW  11.6 % (11.6-15.6)   09/10/18  07:30    


 


Plt Count  205 K/MM3 (134-434)   09/10/18  07:30    


 


MPV  7.4 fl (7.5-11.1)  L  09/10/18  07:30    


 


Absolute Neuts (auto)  12.4 K/mm3  09/10/18  07:30    


 


Neutrophils %  No Result Required.   09/10/18  07:30    


 


Lymphocytes %  No Result Required.   09/10/18  07:30    


 


Monocytes %  6.8 % (3.8-10.2)   09/09/18  14:46    


 


Eosinophils %  0.9 % (0-4.5)   09/09/18  14:46    


 


Basophils %  4.6 % (0-2.0)  H  09/09/18  14:46    








 CMP











Sodium  136 mmol/L (136-145)   09/10/18  07:30    


 


Potassium  3.4 mmol/L (3.5-5.1)  L  09/10/18  07:30    


 


Chloride  106 mmol/L ()   09/10/18  07:30    


 


Carbon Dioxide  24 mmol/L (22-28)   09/10/18  07:30    


 


Anion Gap  6 MMOL/L (8-16)  L  09/10/18  07:30    


 


BUN  10 mg/dl (7-18)   09/10/18  07:30    


 


Creatinine  0.8 mg/dl (0.6-1.3)   09/10/18  07:30    


 


Creat Clearance w eGFR  > 60  (>60)   09/10/18  07:30    


 


Random Glucose  125 mg/dl ()  H D 09/10/18  07:30    


 


Lactic Acid  1.5 mmol/L (0.0-2.0)   09/09/18  19:10    


 


Calcium  8.4 mg/dl (8.4-10.2)   09/10/18  07:30    


 


Phosphorus  2.4 mg/dl (2.5-4.6)  L  09/10/18  07:30    


 


Magnesium  1.7 mg/dL (1.8-2.4)  L  09/10/18  07:30    


 


Total Bilirubin  1.2 mg/dl (0.2-1.0)  H  09/09/18  14:46    


 


AST  28 U/L (10-42)  D 09/09/18  14:46    


 


ALT  23 U/L (10-40)  D 09/09/18  14:46    


 


Alkaline Phosphatase  61 U/L (32-92)   09/09/18  14:46    


 


Total Protein  7.3 g/dl (6.4-8.3)   09/09/18  14:46    


 


Albumin  4.7 g/dl (3.5-5.0)   09/09/18  14:46    


 


Lipase  77 U/L ()   09/09/18  14:46    











Active Medications











Generic Name Dose Route Start Last Admin





  Trade Name Freq  PRN Reason Stop Dose Admin


 


Enoxaparin Sodium  40 mg  09/10/18 10:00  





  Lovenox -  SQ   





  DAILY ERLIN   





     





     





     





     


 


Hydromorphone HCl  1 mg  09/09/18 21:17  09/10/18 05:55





  Dilaudid Injection -  IVPUSH   1 mg





  Q4H PRN   Administration





  PAIN LEVEL 7 - 10   





     





     





     


 


Sodium Chloride  1,000 mls @ 100 mls/hr  09/09/18 18:45  09/09/18 19:01





  Normal Saline -  IV   100 mls/hr





  ASDIR ERLIN   Administration





     





     





     





     


 


Metronidazole  250 mg in 50 mls @ 50 mls/hr  09/10/18 02:00  09/10/18 01:47





  Flagyl 250mg Premixed Ivpb -  IVPB   50 mls/hr





  Q8H-IV ERLIN   Administration





     





     





     





     


 


Levofloxacin  750 mg in 150 mls @ 100 mls/hr  09/10/18 10:00  





  Levaquin 750 Mg Premixed Ivpb -  IVPB   





  DAILY ERLIN   





     





     





  Protocol   





     


 


Ondansetron HCl  4 mg  09/09/18 18:23  





  Zofran Injection  IVPUSH   





  Q6H PRN   





  NAUSEA   





     





     





     








IMAGING


CT of abdomen and pelvis with IV contrast diffuse fatty infiltration of liver 

thickening or irregularity of the left colon consistent with colitis





ASSESSMENT/PLAN:


1) Colitis


-  episode of bloody diarrhea noted at bedside,  she reports ongoing abdominal 

pain,we will upgrade antibiotic to Zosyn and continue Flagyl


- Continue nothing by mouth


- GI Dr Gomez consulted and followed


- appreciate surgery input 





2) cardiovascular


HTN


- bp stable, resume meds as soon as diet initiated or sooner if BP elevated





DVT PPX


   - deferred given reported episode hematochezia





FEN


   - d5ns w/20meq kci @100ml/hr 


   - BMP in am


   - NPO for now





Dispo: pt currently requires further inpatient management of her emergent 

condition.





Visit type





- Emergency Visit


Emergency Visit: Yes


ED Registration Date: 09/09/18


Care time: The patient presented to the Emergency Department on the above date 

and was hospitalized for further evaluation of their emergent condition.





- New Patient


This patient is new to me today: No





- Critical Care


Critical Care patient: No





- Discharge Referral


Referred to [facility] No

## 2018-09-11 LAB
ALBUMIN SERPL-MCNC: 3.3 G/DL (ref 3.5–5)
ALP SERPL-CCNC: 40 U/L (ref 32–92)
ALT SERPL-CCNC: 16 U/L (ref 10–40)
ANION GAP SERPL CALC-SCNC: 7 MMOL/L (ref 8–16)
AST SERPL-CCNC: 15 U/L (ref 10–42)
BASOPHILS # BLD: 0.2 % (ref 0–2)
BILIRUB SERPL-MCNC: 0.8 MG/DL (ref 0.2–1)
BUN SERPL-MCNC: 7 MG/DL (ref 7–18)
CALCIUM SERPL-MCNC: 8.4 MG/DL (ref 8.4–10.2)
CHLORIDE SERPL-SCNC: 105 MMOL/L (ref 98–107)
CO2 SERPL-SCNC: 26 MMOL/L (ref 22–28)
CREAT SERPL-MCNC: 1 MG/DL (ref 0.6–1.3)
DEPRECATED RDW RBC AUTO: 11.9 % (ref 11.6–15.6)
EOSINOPHIL # BLD: 2.4 % (ref 0–4.5)
GLUCOSE SERPL-MCNC: 121 MG/DL (ref 74–106)
HCT VFR BLD CALC: 32.2 % (ref 32.4–45.2)
HGB BLD-MCNC: 10.6 GM/DL (ref 10.7–15.3)
LYMPHOCYTES # BLD: 16.2 % (ref 8–40)
MAGNESIUM SERPL-MCNC: 2.1 MG/DL (ref 1.8–2.4)
MCH RBC QN AUTO: 30.2 PG (ref 25.7–33.7)
MCHC RBC AUTO-ENTMCNC: 32.9 G/DL (ref 32–36)
MCV RBC: 91.6 FL (ref 80–96)
MONOCYTES # BLD AUTO: 7.9 % (ref 3.8–10.2)
NEUTROPHILS # BLD: 73.3 % (ref 42.8–82.8)
PHOSPHATE SERPL-MCNC: 2.5 MG/DL (ref 2.5–4.6)
PLATELET # BLD AUTO: 188 K/MM3 (ref 134–434)
PMV BLD: 7.4 FL (ref 7.5–11.1)
POTASSIUM SERPLBLD-SCNC: 4 MMOL/L (ref 3.5–5.1)
PROT SERPL-MCNC: 5.7 G/DL (ref 6.4–8.3)
RBC # BLD AUTO: 3.52 M/MM3 (ref 3.6–5.2)
SODIUM SERPL-SCNC: 138 MMOL/L (ref 136–145)
WBC # BLD AUTO: 13.3 K/MM3 (ref 4–10.8)

## 2018-09-11 RX ADMIN — HYDROMORPHONE HYDROCHLORIDE PRN MG: 2 INJECTION, SOLUTION INTRAMUSCULAR; INTRAVENOUS; SUBCUTANEOUS at 02:29

## 2018-09-11 RX ADMIN — POTASSIUM CHLORIDE, DEXTROSE MONOHYDRATE AND SODIUM CHLORIDE SCH MLS/HR: 150; 5; 900 INJECTION, SOLUTION INTRAVENOUS at 09:48

## 2018-09-11 RX ADMIN — PIPERACILLIN AND TAZOBACTAM SCH MLS/HR: 4; .5 INJECTION, POWDER, LYOPHILIZED, FOR SOLUTION INTRAVENOUS at 01:40

## 2018-09-11 RX ADMIN — HYDROMORPHONE HYDROCHLORIDE PRN MG: 2 INJECTION, SOLUTION INTRAMUSCULAR; INTRAVENOUS; SUBCUTANEOUS at 14:29

## 2018-09-11 RX ADMIN — PIPERACILLIN AND TAZOBACTAM SCH MLS/HR: 4; .5 INJECTION, POWDER, LYOPHILIZED, FOR SOLUTION INTRAVENOUS at 17:58

## 2018-09-11 RX ADMIN — HYDROMORPHONE HYDROCHLORIDE PRN MG: 2 INJECTION, SOLUTION INTRAMUSCULAR; INTRAVENOUS; SUBCUTANEOUS at 09:35

## 2018-09-11 RX ADMIN — Medication SCH TAB: at 09:35

## 2018-09-11 RX ADMIN — PANTOPRAZOLE SODIUM SCH MG: 40 INJECTION, POWDER, FOR SOLUTION INTRAVENOUS at 09:35

## 2018-09-11 RX ADMIN — ENOXAPARIN SODIUM SCH: 40 INJECTION SUBCUTANEOUS at 09:34

## 2018-09-11 RX ADMIN — RAMIPRIL SCH: 5 CAPSULE ORAL at 22:00

## 2018-09-11 RX ADMIN — PIPERACILLIN AND TAZOBACTAM SCH MLS/HR: 4; .5 INJECTION, POWDER, LYOPHILIZED, FOR SOLUTION INTRAVENOUS at 09:35

## 2018-09-11 RX ADMIN — POTASSIUM CHLORIDE, DEXTROSE MONOHYDRATE AND SODIUM CHLORIDE SCH MLS/HR: 150; 5; 900 INJECTION, SOLUTION INTRAVENOUS at 14:00

## 2018-09-11 NOTE — PN
Physical Exam: 


SUBJECTIVE: Patient seen and examined, patient reports being Slightly improved 

reports less pain, 1 episode of bloody diarrhea today, denies any fever





OBJECTIVE:Patient is a 58-year-old female, with a past medical history of 

hypertension and diverticulosis. patient was admitted from the emergency 

department for infectious colitis.  





 Vital Signs











 Period  Temp  Pulse  Resp  BP Sys/Hernandez  Pulse Ox


 


 Last 24 Hr  98.3 F-99.7 F  84-95  18-20  109-137/71-74  93-97














GENERAL: The patient is awake, alert, and fully oriented, in no acute distress.


HEAD: Normal with no signs of trauma.


EYES: PERRL, extraocular movements intact, sclera anicteric, conjunctiva clear. 

No ptosis. 


ENT: Ears normal, nares patent, oropharynx clear without exudates, moist mucous 


membranes.


NECK: Trachea midline, full range of motion, supple. 


LUNGS: Breath sounds equal, clear to auscultation bilaterally, no wheezes, no 

crackles, no 


accessory muscle use. 


HEART: Regular rate and rhythm, S1, S2 without murmur, rub or gallop.


ABDOMEN: Soft,Positive guarding and tenderness to left lower quadrant,  

nondistended, normoactive bowel sounds, no hepatosplenomegaly, no masses.


EXTREMITIES: 2+ pulses, warm, well-perfused, no edema. 


NEUROLOGICAL: Cranial nerves II through XII grossly intact. Normal speech, gait 

not 


observed.


PSYCH: Normal mood, normal affect.


SKIN: Warm, dry, normal turgor, no rashes or lesions noted











 Laboratory Results - last 24 hr











  09/10/18 09/10/18 09/11/18





  07:30 13:00 08:30


 


WBC    13.3 H


 


RBC    3.52 L


 


Hgb    10.6 L


 


Hct    32.2 L


 


MCV    91.6


 


MCH    30.2


 


MCHC    32.9


 


RDW    11.9


 


Plt Count    188


 


MPV    7.4 L


 


Absolute Neuts (auto)    9.9


 


Neutrophils %    73.3


 


Neutrophils % (Manual)  77.0  


 


Band Neutrophils %  5.0  


 


Lymphocytes %    16.2


 


Lymphocytes % (Manual)  12.0  


 


Monocytes %    7.9


 


Monocytes % (Manual)  6  


 


Eosinophils %    2.4


 


Basophils %    0.2


 


Platelet Estimate  Adequate  


 


Sodium   


 


Potassium   


 


Chloride   


 


Carbon Dioxide   


 


Anion Gap   


 


BUN   


 


Creatinine   


 


Creat Clearance w eGFR   


 


Random Glucose   


 


Calcium   


 


Phosphorus   


 


Magnesium   


 


Total Bilirubin   


 


AST   


 


ALT   


 


Alkaline Phosphatase   


 


Total Protein   


 


Albumin   


 


Stool Occult Blood   Positive 














  09/11/18





  08:30


 


WBC 


 


RBC 


 


Hgb 


 


Hct 


 


MCV 


 


MCH 


 


MCHC 


 


RDW 


 


Plt Count 


 


MPV 


 


Absolute Neuts (auto) 


 


Neutrophils % 


 


Neutrophils % (Manual) 


 


Band Neutrophils % 


 


Lymphocytes % 


 


Lymphocytes % (Manual) 


 


Monocytes % 


 


Monocytes % (Manual) 


 


Eosinophils % 


 


Basophils % 


 


Platelet Estimate 


 


Sodium  138


 


Potassium  4.0


 


Chloride  105


 


Carbon Dioxide  26


 


Anion Gap  7 L


 


BUN  7


 


Creatinine  1.0


 


Creat Clearance w eGFR  57.35


 


Random Glucose  121 H


 


Calcium  8.4


 


Phosphorus  2.5


 


Magnesium  2.1


 


Total Bilirubin  0.8


 


AST  15  D


 


ALT  16  D


 


Alkaline Phosphatase  40  D


 


Total Protein  5.7 L


 


Albumin  3.3 L


 


Stool Occult Blood 








Active Medications











Generic Name Dose Route Start Last Admin





  Trade Name Freq  PRN Reason Stop Dose Admin


 


Enoxaparin Sodium  40 mg  09/10/18 10:00  09/11/18 09:34





  Lovenox -  SQ   Not Given





  DAILY RELIN   





     





     





     





     


 


Hydromorphone HCl  1 mg  09/09/18 21:17  09/11/18 09:35





  Dilaudid Injection -  IVPUSH   1 mg





  Q4H PRN   Administration





  PAIN LEVEL 7 - 10   





     





     





     


 


Metronidazole  250 mg in 50 mls @ 50 mls/hr  09/10/18 02:00  09/11/18 09:39





  Flagyl 250mg Premixed Ivpb -  IVPB   50 mls/hr





  Q8H-IV ERLIN   Administration





     





     





     





     


 


Dextrose/Sodium Chloride  20 meq in 1,000 mls @ 100 mls/hr  09/10/18 09:45  09/ 11/18 09:48





  Dextrose 5%-Normal Saline+20 Meq Kcl -  IV   100 mls/hr





  ASDIR ERLIN   Administration





     





     





     





     


 


Piperacillin Sod/Tazobactam Sod  4.5 gm in 100 mls @ 200 mls/hr  09/10/18 10:30

  09/11/18 09:35





  Zosyn 4.5gm Ivpb (Pre-Docked)  IVPB   200 mls/hr





  Q8H-IV ERLIN   Administration





     





     





  Protocol   





     


 


Lactobacillus Acidophilus  1 tab  09/10/18 13:00  09/11/18 09:35





  Bacid -  PO   1 tab





  DAILY ERLIN   Administration





     





     





     





     


 


Ondansetron HCl  4 mg  09/09/18 18:23  





  Zofran Injection  IVPUSH   





  Q6H PRN   





  NAUSEA   





     





     





     


 


Pantoprazole Sodium  40 mg  09/10/18 14:15  09/11/18 09:35





  Protonix Iv  IVPUSH   40 mg





  DAILY ERLIN   Administration





     





     





     





     





 Microbiology











 09/10/18 13:00 Clostridium difficile Antigen (RANDY) - Final





 Stool Clostridium difficile Toxin Assay - Final, Negative


 


 09/10/18 13:00 Salmonella/Shigella Culture - Preliminary





 Stool    NO ENTERIC PATHOGENS, 24 HOURS, ON PRIMARY PLATES





 Yersinia Culture - Preliminary





    NO ENTERIC PATHOGENS, 24 HOURS, ON PRIMARY PLATES





 Vibrio Culture - Final





 Escherichia coli 0157 Culture - Final





    NO GROWTH OF E COLI 0157 OBTAINED


 


 09/09/18 19:05 Blood Culture - Preliminary





 Blood - Peripheral Venous    NO GROWTH OBTAINED AFTER 24 HOURS, INCUBATION TO 

CONTINUE





    FOR 4 DAYS.


 


 09/09/18 18:40 Blood Culture - Preliminary





 Blood - Peripheral Venous    NO GROWTH OBTAINED AFTER 24 HOURS, INCUBATION TO 

CONTINUE





    FOR 4 DAYS.

















IMAGING


CT of abdomen and pelvis with IV contrast diffuse fatty infiltration of liver 

thickening or irregularity of the left colon consistent with colitis





ASSESSMENT/PLAN:


1) Colitis


-  WBCs downtrending will continue Zosyn and Flagyl, stool cultures noted 

awaiting finall


- Start clear liquid diet


- GI Dr Gomez consulted and followed


- appreciate surgery input 





2) cardiovascular


HTN


- bp stable, Restart home dose ramipril, close monitoring 





DVT PPX


   - deferred given reported episode hematochezia


   - mechanical AC only 








FEN


   - d5ns w/20meq kci @ 75ml/hr  


   - BMP in am


   - clear liquid diet 





Dispo: pt currently requires further inpatient management of her emergent 

condition.





Visit type





- Emergency Visit


Emergency Visit: Yes


ED Registration Date: 09/09/18


Care time: The patient presented to the Emergency Department on the above date 

and was hospitalized for further evaluation of their emergent condition.





- New Patient


This patient is new to me today: No





- Critical Care


Critical Care patient: No





- Discharge Referral


Referred to [facility] No

## 2018-09-11 NOTE — PN
Progress Note, Physician


History of Present Illness: 





Awake, alert


Supine in bed


C/O episodic crampy L sided abdominal pain


+ one bloody BM this morning


Low grade temp.  WBC pending


BC prelim no growth


Stool studies pending





- Current Medication List


Current Medications: 


Active Medications





Enoxaparin Sodium (Lovenox -)  40 mg SQ DAILY [facility]


   Last Admin: 09/10/18 09:50 Dose:  Not Given


Hydromorphone HCl (Dilaudid Injection -)  1 mg IVPUSH Q4H PRN


   PRN Reason: PAIN LEVEL 7 - 10


   Last Admin: 09/11/18 02:29 Dose:  1 mg


Metronidazole (Flagyl 250mg Premixed Ivpb -)  250 mg in 50 mls @ 50 mls/hr IVPB 

Q8H-IV ERLIN


   Last Admin: 09/11/18 01:40 Dose:  50 mls/hr


Dextrose/Sodium Chloride (Dextrose 5%-Normal Saline+20 Meq Kcl -)  20 meq in 1,

000 mls @ 100 mls/hr IV ASDIR [facility]


   Last Admin: 09/10/18 10:18 Dose:  100 mls/hr


Piperacillin Sod/Tazobactam Sod (Zosyn 4.5gm Ivpb (Pre-Docked))  4.5 gm in 100 

mls @ 200 mls/hr IVPB Q8H-IV ERLIN; Protocol


   Last Admin: 09/11/18 01:40 Dose:  200 mls/hr


Lactobacillus Acidophilus (Bacid -)  1 tab PO DAILY [facility]


   Last Admin: 09/10/18 14:25 Dose:  1 tab


Ondansetron HCl (Zofran Injection)  4 mg IVPUSH Q6H PRN


   PRN Reason: NAUSEA


Pantoprazole Sodium (Protonix Iv)  40 mg IVPUSH DAILY [facility]


   Last Admin: 09/10/18 14:25 Dose:  40 mg











- Objective


Vital Signs: 


 Vital Signs











Temperature  98.3 F   09/11/18 06:00


 


Pulse Rate  95 H  09/11/18 06:00


 


Respiratory Rate  18   09/11/18 06:00


 


Blood Pressure  123/74   09/11/18 06:00


 


O2 Sat by Pulse Oximetry (%)  97   09/11/18 06:00











Constitutional: Yes: No Distress


Eyes: Yes: Conjunctiva Clear


Cardiovascular: Yes: Regular Rate and Rhythm, S1, S2


Respiratory: Yes: CTA Bilaterally


Gastrointestinal: Yes: Normal Bowel Sounds, Soft, Hypoactive Bowel Sounds, 

Tenderness, Other (+ LLQ tenderness to palp)


Edema: No





Assessment/Plan





L colitis  R/O enteric pathogens


Low grade fever/ leukocytosis


Await BC, stool studies


Continue empiric zosyn/ flagyl

## 2018-09-11 NOTE — CONS
INFECTIOUS DISEASE CONSULTATION

 

DATE OF CONSULTATION:

 

DATE OF DICTATION:  2018

 

The patient is a 58-year-old female evaluated for colitis.

 

The patient was well until the early morning of 2018.

 

The patient reports hosting a party at her home on the evening of the .  She had

consumed a meal of various foods of which all attendees consumed.  She was awakened

from sleep with severe left lower quadrant abdominal pain, nausea, vomiting x1, and

chills.  She developed diarrhea and noted bloody diarrhea.  She presented to the

emergency room where a CAT scan of the abdomen and pelvis was performed and showed

left-sided colitis.  Her course was complicated by low-grade fever, 99.8, and

elevated white blood cell count, 16.1.  At the present time, she complains of left

lower quadrant, crampy abdominal pain which is episodic in nature.  She has had

nausea but no recurrent vomiting.  She continues to have bloody bowel movements.

 

She denies any associated high-grade fever or shaking chills.

 

No other attendees became ill with gastrointestinal symptom.  She denies any recent

travel or antibiotic therapy.

 

She has a history of irritable bowel syndrome which has been relatively well

controlled.  A colonoscopy performed in January of this year, revealed

diverticulosis.

 

PAST MEDICAL HISTORY:  Positive for hypertension, diverticulosis, gastroesophageal

reflux, irritable bowel.

 

PAST SURGICAL HISTORY:  Status post  section.

 

ALLERGIES:  LINZESS.

 

HOME MEDICATIONS:  Include Ramipril, Nexium, hydrochlorothiazide.

 

SOCIAL HISTORY:  Negative for tobacco, occasional EtOH.

 

SYSTEMS REVIEW:

Neurologic:  No loss of consciousness, seizure activity, focal weakness.

Cardiac:  Negative chest pain or palpitations.

Respiratory:  Negative cough or sputum production.

Gastrointestinal:  As per HPI.

Genitourinary:  Negative for urinary tract infection.

 

LABORATORY DATA:  White count on admission 16.1, presently 15.3.  Differential: 

Neutrophils 77, lymphocytes 10, monocytes 6, basophils 4.  Hematocrit 35.8.  Platelet

count 205.  Creatinine 0.8.  Urinalysis:  One white cell.  Liver enzymes

unremarkable.  Cultures are pending.

 

PHYSICAL EXAMINATION:

General:  She is awake, weak appearing, in moderate distress secondary to abdominal

discomfort.

Vital Signs:  Temperature 98.7, T-max 99.8; blood pressure 137/74; pulse 90, regular;

respirations 20 per minute.

HEENT:  Sclerae are anicteric.

Heart:  Sounds S1, S2.

Lungs:  Clear.

Abdomen:  Soft.  Left lower quadrant tenderness to palpation.  No mass, rebound, or

rigidity.

Extremities:  Negative for edema.

 

IMPRESSION:

1.  Colitis, rule out enteric pathogens.

2.  Fever, leukocytosis, rule out sepsis secondary to gastrointestinal source.

 

Await blood cultures, stool for culture and sensitivity, ova and parasite, C.

difficile, Norovirus, and rotavirus.  Empiric antibiotic coverage with Zosyn and

Flagyl.  IV fluid hydration.

 

Thank you for the kind referral.

 

 

GABRIELA DEJESUS M.D.

 

[id number]

DD: 2018 10:52

DT: 2018 11:19

Job #:  [id number]

## 2018-09-12 RX ADMIN — PIPERACILLIN AND TAZOBACTAM SCH MLS/HR: 4; .5 INJECTION, POWDER, LYOPHILIZED, FOR SOLUTION INTRAVENOUS at 02:00

## 2018-09-12 RX ADMIN — RAMIPRIL SCH MG: 5 CAPSULE ORAL at 21:06

## 2018-09-12 RX ADMIN — PANTOPRAZOLE SODIUM SCH MG: 40 INJECTION, POWDER, FOR SOLUTION INTRAVENOUS at 09:54

## 2018-09-12 RX ADMIN — POTASSIUM CHLORIDE, DEXTROSE MONOHYDRATE AND SODIUM CHLORIDE SCH MLS/HR: 150; 5; 900 INJECTION, SOLUTION INTRAVENOUS at 17:22

## 2018-09-12 RX ADMIN — HYDROMORPHONE HYDROCHLORIDE PRN MG: 2 INJECTION, SOLUTION INTRAMUSCULAR; INTRAVENOUS; SUBCUTANEOUS at 21:27

## 2018-09-12 RX ADMIN — HYDROMORPHONE HYDROCHLORIDE PRN MG: 2 INJECTION, SOLUTION INTRAMUSCULAR; INTRAVENOUS; SUBCUTANEOUS at 00:00

## 2018-09-12 RX ADMIN — ENOXAPARIN SODIUM SCH: 40 INJECTION SUBCUTANEOUS at 09:47

## 2018-09-12 RX ADMIN — Medication SCH TAB: at 09:55

## 2018-09-12 RX ADMIN — HYDROMORPHONE HYDROCHLORIDE PRN MG: 2 INJECTION, SOLUTION INTRAMUSCULAR; INTRAVENOUS; SUBCUTANEOUS at 12:01

## 2018-09-12 RX ADMIN — PIPERACILLIN AND TAZOBACTAM SCH MLS/HR: 4; .5 INJECTION, POWDER, LYOPHILIZED, FOR SOLUTION INTRAVENOUS at 17:23

## 2018-09-12 RX ADMIN — PIPERACILLIN AND TAZOBACTAM SCH MLS/HR: 4; .5 INJECTION, POWDER, LYOPHILIZED, FOR SOLUTION INTRAVENOUS at 09:55

## 2018-09-12 RX ADMIN — RAMIPRIL SCH MG: 5 CAPSULE ORAL at 09:55

## 2018-09-12 NOTE — PN
Progress Note, Physician


History of Present Illness: 





Awake, alert


Supine in bed


Reports less frequent L sided abdominal pain


Still with bloody BM


Afebrile   WBC improved


BC  no growth


Stool cultures no growth





- Current Medication List


Current Medications: 


Active Medications





Enoxaparin Sodium (Lovenox -)  40 mg SQ DAILY [facility]


   Last Admin: 09/12/18 09:47 Dose:  Not Given


Hydromorphone HCl (Dilaudid Injection -)  1 mg IVPUSH Q4H PRN


   PRN Reason: PAIN LEVEL 7 - 10


   Last Admin: 09/12/18 00:00 Dose:  1 mg


Metronidazole (Flagyl 250mg Premixed Ivpb -)  250 mg in 50 mls @ 50 mls/hr IVPB 

Q8H-IV ERLIN


   Last Admin: 09/12/18 09:55 Dose:  50 mls/hr


Piperacillin Sod/Tazobactam Sod (Zosyn 4.5gm Ivpb (Pre-Docked))  4.5 gm in 100 

mls @ 200 mls/hr IVPB Q8H-IV ERLIN; Protocol


   Last Admin: 09/12/18 09:55 Dose:  200 mls/hr


Dextrose/Sodium Chloride (Dextrose 5%-Normal Saline+20 Meq Kcl -)  20 meq in 1,

000 mls @ 75 mls/hr IV ASDIR ERLIN


   Last Admin: 09/11/18 14:00 Dose:  75 mls/hr


Lactobacillus Acidophilus (Bacid -)  1 tab PO DAILY [facility]


   Last Admin: 09/12/18 09:55 Dose:  1 tab


Ondansetron HCl (Zofran Injection)  4 mg IVPUSH Q6H PRN


   PRN Reason: NAUSEA


Pantoprazole Sodium (Protonix Iv)  40 mg IVPUSH DAILY [facility]


   Last Admin: 09/12/18 09:54 Dose:  40 mg


Ramipril (Altace -)  5 mg PO DAILY [facility]


   Last Admin: 09/12/18 09:55 Dose:  5 mg


Ramipril (Altace -)  10 mg PO HS [facility]


   Last Admin: 09/11/18 22:00 Dose:  Not Given











- Objective


Vital Signs: 


 Vital Signs











Temperature  98.6 F   09/12/18 09:00


 


Pulse Rate  75   09/12/18 09:00


 


Respiratory Rate  18   09/12/18 09:00


 


Blood Pressure  136/86   09/12/18 09:00


 


O2 Sat by Pulse Oximetry (%)  95   09/12/18 08:49











Constitutional: Yes: No Distress


Eyes: Yes: Conjunctiva Clear


Cardiovascular: Yes: Regular Rate and Rhythm, S1, S2


Respiratory: Yes: CTA Bilaterally


Gastrointestinal: Yes: Normal Bowel Sounds, Soft, Tenderness, Other (+ LLQ 

tenderness to palp)


Edema: No


Labs: 


 CBC, BMP





 09/11/18 08:30 





 09/11/18 08:30 











Assessment/Plan





L colitis  R/O enteric pathogens  clinically improved


Low grade fever/ leukocytosis   improved


BC, stool studies prelim negative


Continue empiric zosyn/ flagyl

## 2018-09-12 NOTE — PN
Progress Note, Physician


History of Present Illness: 





feeling somewhat better states her stool is becoming more formed , still with 

some blood. Complains of abdominal bloating. Tolerating clear liquid diet. 





- Current Medication List


Current Medications: 


Active Medications





Enoxaparin Sodium (Lovenox -)  40 mg SQ DAILY [facility]


   Last Admin: 09/12/18 09:47 Dose:  Not Given


Hydromorphone HCl (Dilaudid Injection -)  1 mg IVPUSH Q4H PRN


   PRN Reason: PAIN LEVEL 7 - 10


   Last Admin: 09/12/18 12:01 Dose:  1 mg


Metronidazole (Flagyl 250mg Premixed Ivpb -)  250 mg in 50 mls @ 50 mls/hr IVPB 

Q8H-IV ERLIN


   Last Admin: 09/12/18 09:55 Dose:  50 mls/hr


Piperacillin Sod/Tazobactam Sod (Zosyn 4.5gm Ivpb (Pre-Docked))  4.5 gm in 100 

mls @ 200 mls/hr IVPB Q8H-IV ERLIN; Protocol


   Last Admin: 09/12/18 09:55 Dose:  200 mls/hr


Dextrose/Sodium Chloride (Dextrose 5%-Normal Saline+20 Meq Kcl -)  20 meq in 1,

000 mls @ 75 mls/hr IV ASDIR [facility]


   Last Admin: 09/11/18 14:00 Dose:  75 mls/hr


Lactobacillus Acidophilus (Bacid -)  1 tab PO DAILY [facility]


   Last Admin: 09/12/18 09:55 Dose:  1 tab


Ondansetron HCl (Zofran Injection)  4 mg IVPUSH Q6H PRN


   PRN Reason: NAUSEA


Pantoprazole Sodium (Protonix Iv)  40 mg IVPUSH DAILY [facility]


   Last Admin: 09/12/18 09:54 Dose:  40 mg


Ramipril (Altace -)  5 mg PO DAILY [facility]


   Last Admin: 09/12/18 09:55 Dose:  5 mg


Ramipril (Altace -)  10 mg PO HS [facility]


   Last Admin: 09/11/18 22:00 Dose:  Not Given











- Objective


Vital Signs: 


 Vital Signs











Temperature  98.6 F   09/12/18 09:00


 


Pulse Rate  75   09/12/18 09:00


 


Respiratory Rate  18   09/12/18 09:00


 


Blood Pressure  136/86   09/12/18 09:00


 


O2 Sat by Pulse Oximetry (%)  95   09/12/18 08:49











Constitutional: Yes: Well Nourished


Eyes: Yes: WNL


HENT: Yes: WNL


Neck: Yes: WNL


Cardiovascular: Yes: WNL, Regular Rate and Rhythm


Respiratory: Yes: WNL, Regular, CTA Bilaterally


Gastrointestinal: Yes: WNL, Normal Bowel Sounds, Other (tender to deep 

palpation lower quadrants , no rebound or guarding , nml bowel sounds)


Extremities: Yes: WNL


Edema: No


Neurological: Yes: WNL


Psychiatric: Yes: WNL


Labs: 


 CBC, BMP





 09/11/18 08:30 





 09/11/18 08:30 











Problem List





- Problems


(1) Colitis


Code(s): K52.9 - NONINFECTIVE GASTROENTERITIS AND COLITIS, UNSPECIFIED   





Assessment/Plan


AXR reviewed ; if her symptoms do not improve by tomorrow rec CT scan abd / 

pelvis with contrast.  


c/w IVF's 


clear liquid diet 


trend h/h qd while hospitalized 


C/W zosyn 


f/u  stool culture -- FINAL RESULTS ARE PENDING c.diff is negative  


plan of care discussed with primary medical team

## 2018-09-12 NOTE — CONS
DATE OF CONSULTATION:  09/12/2018 

 

REASON FOR CONSULTATION:  Colitis.  This is a consultation at the request of the

hospitalist service.  This is an inpatient consultation.

 

BRIEF HISTORY:  This is a 58-year-old female with past medical history of

hypertension and irritable bowel syndrome who presented to [facility]

with abdominal pain, nausea, vomiting, and bloody diarrhea.  She had a CAT scan

consistent with colitis without perforation, without abscess.  She was treated with

intravenous antibiotics including Flagyl and Zosyn, and she currently feels better. 

Her white blood cell count was initially 16,000 and has currently come down to

13,000, and she has been started on liquids by the medical team.  She has been

afebrile for 48 hours, with a T-max this admission of 99.8.  

 

PAST MEDICAL HISTORY:  As stated in HPI.  

 

PAST SURGICAL HISTORY:  Includes cesarian section x2.

 

HOME MEDICATIONS:  Include ramipril and hydrochlorothiazide.  

 

SOCIAL HISTORY:  Positive for occasional alcohol consumption.  

 

FAMILY HISTORY:  Negative for diverticulitis in the family.  Negative for colon

cancer in the family.  Her mother did have breast cancer and her father did have

prostate cancer.  

 

REVIEW OF SYSTEMS:  

General:  Denies fatigue or malaise.

Cardiac:  Denies chest pain or palpitations.

Respiratory:  No shortness of breath or wheeze.

Gastrointestinal:  As stated in HPI.  Denies recent weight loss.  She has had a

colonoscopy within the last 2 years.  

Genitourinary:  Denies dysuria.

Musculoskeletal:  Denies joint pain, joint swelling.

Psychiatric:  Denies herself voices.  

 

PHYSICAL EXAMINATION:  

General:  This is an obese 58-year-old female in no distress.  

Vital Signs:  She is afebrile.  Her vital signs are stable.  

HEENT:  Her head is normocephalic. Sclerae are anicteric.

Neck:  Supple.

Chest:  Clear.

Abdomen:  Soft.  She has significant left-sided tenderness with rebound.  She has a

well-healed Pfannenstiel incision.  She has no obvious hernias.  

Extremities:  Trace edema.  

 

LABORATORY:  White blood cell count is elevated at 13,000.  Her chemistries are

unremarkable.  

 

IMAGING:  She has a CAT scan of her abdomen and pelvis from September 9 which shows

diffuse fatty liver infiltration, thickening and irregularity of the left colon

consistent with colitis, sigmoid diverticulosis without evidence of diverticulitis.  

 

ASSESSMENT:  A 58-year-old female with abdominal pain, nausea, vomiting, bloody

diarrhea, CAT scan evidence of colitis with elevated white blood cell count and

left-sided tenderness.  Clinically, this is colitis.  It is rather severe; however,

there is no perforation.  There is no collection.  This does not appear to be

diverticulitis.  At this point she is being managed medically and clinically has

improved.  She has been started on liquids.  I would be slow to advance as she is

still significantly tender.  If the patient were to deteriorate, surgical options

would include a left-sided colectomy with colostomy.  Patient is not interested in

that unless it was only life saving and emergent.  At this point I will be available

if the patient deteriorates and continue management per Gastroenterology.  She will

likely need a repeat colonoscopy at some point in the future.  

 

 

DO MARIO SEAY/[id number]

DD: 09/12/2018 10:38

DT: 09/12/2018 11:58

Job #:  [id number]

## 2018-09-12 NOTE — PN
Physical Exam: 


SUBJECTIVE: Patient seen and examined, Reports feeling improved, sitting in 

chair tolerating clear liquids, reports less abdominal pain








OBJECTIVE:





 Vital Signs











 Period  Temp  Pulse  Resp  BP Sys/Hernandez  Pulse Ox


 


 Last 24 Hr  98.4 F-99.0 F  68-87  18-20  122-136/74-86  93-95











GENERAL: The patient is awake, alert, and fully oriented, in no acute distress.


HEAD: Normal with no signs of trauma.


EYES: PERRL, extraocular movements intact, sclera anicteric, conjunctiva clear. 

No ptosis. 


ENT: Ears normal, nares patent, oropharynx clear without exudates, moist mucous 


membranes.


NECK: Trachea midline, full range of motion, supple. 


LUNGS: Breath sounds equal, clear to auscultation bilaterally, no wheezes, no 

crackles, no 


accessory muscle use. 


HEART: Regular rate and rhythm, S1, S2 without murmur, rub or gallop.


ABDOMEN: Soft, Left lower quadrant tender upon deep palpation, hyperactive 

bowel sounds, no guarding, no 


rebound, no hepatosplenomegaly, no masses.


EXTREMITIES: 2+ pulses, warm, well-perfused, no edema. 


NEUROLOGICAL: Cranial nerves II through XII grossly intact. Normal speech, gait 

not 


observed.


PSYCH: Normal mood, normal affect.


SKIN: Warm, dry, normal turgor, no rashes or lesions noted














 Laboratory Results - last 24 hr











  09/10/18





  13:00


 


Stool O & P Wet Mount  


 


O & P Permanent Slide  Final report





 CBC











WBC  13.3 K/mm3 (4.0-10.8)  H  09/11/18  08:30    


 


RBC  3.52 M/mm3 (3.60-5.2)  L  09/11/18  08:30    


 


Hgb  10.6 GM/dl (10.7-15.3)  L  09/11/18  08:30    


 


Hct  32.2 % (32.4-45.2)  L  09/11/18  08:30    


 


MCV  91.6 fl (80-96)   09/11/18  08:30    


 


MCH  30.2 pg (25.7-33.7)   09/11/18  08:30    


 


MCHC  32.9 g/dl (32.0-36.0)   09/11/18  08:30    


 


RDW  11.9 % (11.6-15.6)   09/11/18  08:30    


 


Plt Count  188 K/MM3 (134-434)   09/11/18  08:30    


 


MPV  7.4 fl (7.5-11.1)  L  09/11/18  08:30    


 


Absolute Neuts (auto)  9.9 K/mm3  09/11/18  08:30    


 


Neutrophils %  73.3 % (42.8-82.8)   09/11/18  08:30    


 


Neutrophils % (Manual)  77.0 % (42.8-82.8)   09/10/18  07:30    


 


Band Neutrophils %  5.0 % (0-10)   09/10/18  07:30    


 


Lymphocytes %  16.2 % (8-40)   09/11/18  08:30    


 


Lymphocytes % (Manual)  12.0 % (8-40)   09/10/18  07:30    


 


Monocytes %  7.9 % (3.8-10.2)   09/11/18  08:30    


 


Monocytes % (Manual)  6 % (3.8-10.2)   09/10/18  07:30    


 


Eosinophils %  2.4 % (0-4.5)   09/11/18  08:30    


 


Basophils %  0.2 % (0-2.0)   09/11/18  08:30    


 


Platelet Estimate  Adequate   09/10/18  07:30    








 CMP











Sodium  138 mmol/L (136-145)   09/11/18  08:30    


 


Potassium  4.0 mmol/L (3.5-5.1)   09/11/18  08:30    


 


Chloride  105 mmol/L ()   09/11/18  08:30    


 


Carbon Dioxide  26 mmol/L (22-28)   09/11/18  08:30    


 


Anion Gap  7 MMOL/L (8-16)  L  09/11/18  08:30    


 


BUN  7 mg/dl (7-18)   09/11/18  08:30    


 


Creatinine  1.0 mg/dl (0.6-1.3)   09/11/18  08:30    


 


Creat Clearance w eGFR  57.35  (>60)   09/11/18  08:30    


 


Random Glucose  121 mg/dl ()  H  09/11/18  08:30    


 


Lactic Acid  1.5 mmol/L (0.0-2.0)   09/09/18  19:10    


 


Calcium  8.4 mg/dl (8.4-10.2)   09/11/18  08:30    


 


Phosphorus  2.5 mg/dl (2.5-4.6)   09/11/18  08:30    


 


Magnesium  2.1 mg/dL (1.8-2.4)   09/11/18  08:30    


 


Total Bilirubin  0.8 mg/dl (0.2-1.0)   09/11/18  08:30    


 


AST  15 U/L (10-42)  D 09/11/18  08:30    


 


ALT  16 U/L (10-40)  D 09/11/18  08:30    


 


Alkaline Phosphatase  40 U/L (32-92)  D 09/11/18  08:30    


 


Total Protein  5.7 g/dl (6.4-8.3)  L  09/11/18  08:30    


 


Albumin  3.3 g/dl (3.5-5.0)  L  09/11/18  08:30    


 


Lipase  77 U/L ()   09/09/18  14:46    











Active Medications











Generic Name Dose Route Start Last Admin





  Trade Name Fre  PRN Reason Stop Dose Admin


 


Enoxaparin Sodium  40 mg  09/10/18 10:00  09/12/18 09:47





  Lovenox -  SQ   Not Given





  DAILY ERLIN   





     





     





     





     


 


Hydromorphone HCl  1 mg  09/09/18 21:17  09/12/18 00:00





  Dilaudid Injection -  IVPUSH   1 mg





  Q4H PRN   Administration





  PAIN LEVEL 7 - 10   





     





     





     


 


Metronidazole  250 mg in 50 mls @ 50 mls/hr  09/10/18 02:00  09/12/18 09:55





  Flagyl 250mg Premixed Ivpb -  IVPB   50 mls/hr





  Q8H-IV ERLIN   Administration





     





     





     





     


 


Piperacillin Sod/Tazobactam Sod  4.5 gm in 100 mls @ 200 mls/hr  09/10/18 10:30

  09/12/18 09:55





  Zosyn 4.5gm Ivpb (Pre-Docked)  IVPB   200 mls/hr





  Q8H-IV ERLIN   Administration





     





     





  Protocol   





     


 


Dextrose/Sodium Chloride  20 meq in 1,000 mls @ 75 mls/hr  09/11/18 13:59  09/11 /18 14:00





  Dextrose 5%-Normal Saline+20 Meq Kcl -  IV   75 mls/hr





  ASDIR ERLIN   Administration





     





     





     





     


 


Lactobacillus Acidophilus  1 tab  09/10/18 13:00  09/12/18 09:55





  Bacid -  PO   1 tab





  DAILY ERLIN   Administration





     





     





     





     


 


Ondansetron HCl  4 mg  09/09/18 18:23  





  Zofran Injection  IVPUSH   





  Q6H PRN   





  NAUSEA   





     





     





     


 


Pantoprazole Sodium  40 mg  09/10/18 14:15  09/12/18 09:54





  Protonix Iv  IVPUSH   40 mg





  DAILY ERLIN   Administration





     





     





     





     


 


Ramipril  5 mg  09/12/18 10:00  09/12/18 09:55





  Altace -  PO   5 mg





  DAILY ERLIN   Administration





     





     





     





     


 


Ramipril  10 mg  09/11/18 22:00  09/11/18 22:00





  Altace -  PO   Not Given





  [facility]   





     





     





     





     





 Microbiology











 09/10/18 13:00 Salmonella/Shigella Culture - Final





 Stool    NO GROWTH OF SALMONELLA OR SHIGELLA SPECIES OBTAINED





 Campylobacter Culture - Final





    NO GROWTH OF CAMPYLOBACTER SPECIES OBTAINED





 Yersinia Culture - Final





    NO GROWTH OF YERSINIA SPECIES OBTAINED





 Vibrio Culture - Final





 Escherichia coli 0157 Culture - Final





    NO GROWTH OF E COLI 0157 OBTAINED


 


 09/09/18 19:05 Blood Culture - Preliminary





 Blood - Peripheral Venous    NO GROWTH OBTAINED AFTER 48 HOURS, INCUBATION TO 

CONTINUE





    FOR 3 DAYS.


 


 09/09/18 18:40 Blood Culture - Preliminary





 Blood - Peripheral Venous    NO GROWTH OBTAINED AFTER 48 HOURS, INCUBATION TO 

CONTINUE





    FOR 3 DAYS.


 


 09/10/18 13:00 Clostridium difficile Antigen (RANDY) - Final, Negative





 Stool Clostridium difficile Toxin Assay - Final

















IMAGING


CT of abdomen and pelvis with IV contrast diffuse fatty infiltration of liver 

thickening or irregularity of the left colon consistent with colitis





ASSESSMENT/PLAN:


1) Colitis


-  WBCs downtrending will continue Zosyn and Flagyl, stool cultures Negative


- Continue clear liquid diet


- GI Dr Gomez consulted and followed


- if abdominal pain continues will repeat ct scan of abd/pelvis tomorrow 


- appreciate surgery input 





2) cardiovascular


HTN


- bp stable, Restart home dose ramipril, close monitoring 





DVT PPX


   - lovenox sq


   - scd 








FEN


   - d5ns w/20meq kci @ 75ml/hr  


   - BMP in am


   - clear liquid diet 





Dispo: pt currently requires further inpatient management of her emergent 

condition.








Visit type





- Emergency Visit


Emergency Visit: Yes


ED Registration Date: 09/09/18


Care time: The patient presented to the Emergency Department on the above date 

and was hospitalized for further evaluation of their emergent condition.





- New Patient


This patient is new to me today: No





- Critical Care


Critical Care patient: No





- Discharge Referral


Referred to [facility] No

## 2018-09-12 NOTE — PN
Progress Note (short form)





- Note


Progress Note: 





surgery





pt seen and examined.  full consult dictated.  58f admitted for colitis, n/v 

and bloody diarrhea.  No evidence of perforation or collection on ct and shows 

left sided colitis.  Pt feeling better on abx therapy.  liquids started by 

medical team





abd- soft, localized left sided tenderness





Plan- no indication for surgical exploration and colectomy/colostomy.  cont 

management per medicine.  will be available if patient worsens.

## 2018-09-13 LAB
ANION GAP SERPL CALC-SCNC: 8 MMOL/L (ref 8–16)
BASOPHILS # BLD: 0.4 % (ref 0–2)
BUN SERPL-MCNC: 6 MG/DL (ref 7–18)
CALCIUM SERPL-MCNC: 8.5 MG/DL (ref 8.4–10.2)
CHLORIDE SERPL-SCNC: 104 MMOL/L (ref 98–107)
CO2 SERPL-SCNC: 25 MMOL/L (ref 22–28)
CREAT SERPL-MCNC: 0.9 MG/DL (ref 0.6–1.3)
DEPRECATED RDW RBC AUTO: 11.7 % (ref 11.6–15.6)
EOSINOPHIL # BLD: 3.1 % (ref 0–4.5)
GLUCOSE SERPL-MCNC: 118 MG/DL (ref 74–106)
HCT VFR BLD CALC: 31.7 % (ref 32.4–45.2)
HGB BLD-MCNC: 10.5 GM/DL (ref 10.7–15.3)
LYMPHOCYTES # BLD: 24.1 % (ref 8–40)
MAGNESIUM SERPL-MCNC: 2 MG/DL (ref 1.8–2.4)
MCH RBC QN AUTO: 30.2 PG (ref 25.7–33.7)
MCHC RBC AUTO-ENTMCNC: 33 G/DL (ref 32–36)
MCV RBC: 91.4 FL (ref 80–96)
MONOCYTES # BLD AUTO: 9 % (ref 3.8–10.2)
NEUTROPHILS # BLD: 63.4 % (ref 42.8–82.8)
PHOSPHATE SERPL-MCNC: 3.1 MG/DL (ref 2.5–4.6)
PLATELET # BLD AUTO: 233 K/MM3 (ref 134–434)
PMV BLD: 7.6 FL (ref 7.5–11.1)
POTASSIUM SERPLBLD-SCNC: 4 MMOL/L (ref 3.5–5.1)
RBC # BLD AUTO: 3.47 M/MM3 (ref 3.6–5.2)
SODIUM SERPL-SCNC: 137 MMOL/L (ref 136–145)
WBC # BLD AUTO: 7.6 K/MM3 (ref 4–10.8)

## 2018-09-13 RX ADMIN — BUTALBITAL, ACETAMINOPHEN, AND CAFFEINE ONE: 50; 325; 40 TABLET ORAL at 10:11

## 2018-09-13 RX ADMIN — HYDROMORPHONE HYDROCHLORIDE PRN MG: 2 INJECTION, SOLUTION INTRAMUSCULAR; INTRAVENOUS; SUBCUTANEOUS at 06:31

## 2018-09-13 RX ADMIN — PIPERACILLIN AND TAZOBACTAM SCH MLS/HR: 4; .5 INJECTION, POWDER, LYOPHILIZED, FOR SOLUTION INTRAVENOUS at 09:24

## 2018-09-13 RX ADMIN — HYDROMORPHONE HYDROCHLORIDE PRN MG: 2 INJECTION, SOLUTION INTRAMUSCULAR; INTRAVENOUS; SUBCUTANEOUS at 02:48

## 2018-09-13 RX ADMIN — PIPERACILLIN AND TAZOBACTAM SCH MLS/HR: 4; .5 INJECTION, POWDER, LYOPHILIZED, FOR SOLUTION INTRAVENOUS at 02:10

## 2018-09-13 RX ADMIN — Medication SCH TAB: at 09:24

## 2018-09-13 RX ADMIN — ENOXAPARIN SODIUM SCH: 40 INJECTION SUBCUTANEOUS at 09:24

## 2018-09-13 RX ADMIN — PANTOPRAZOLE SODIUM SCH MG: 40 INJECTION, POWDER, FOR SOLUTION INTRAVENOUS at 09:24

## 2018-09-13 RX ADMIN — RAMIPRIL SCH MG: 5 CAPSULE ORAL at 09:25

## 2018-09-13 RX ADMIN — BUTALBITAL, ACETAMINOPHEN, AND CAFFEINE ONE TABLET: 50; 325; 40 TABLET ORAL at 10:04

## 2018-09-13 RX ADMIN — PIPERACILLIN AND TAZOBACTAM SCH MLS/HR: 4; .5 INJECTION, POWDER, LYOPHILIZED, FOR SOLUTION INTRAVENOUS at 17:54

## 2018-09-13 RX ADMIN — RAMIPRIL SCH MG: 5 CAPSULE ORAL at 21:26

## 2018-09-13 RX ADMIN — HYDROMORPHONE HYDROCHLORIDE PRN MG: 2 INJECTION, SOLUTION INTRAMUSCULAR; INTRAVENOUS; SUBCUTANEOUS at 21:22

## 2018-09-13 NOTE — PN
Physical Exam: 


SUBJECTIVE: Patient seen and examined, reports small improvment of abdominal 

pain does report ongoing nausea and decreased appetite.  





OBJECTIVE:


Patient is a 58-year-old female, with a past medical history of hypertension 

and diverticulosis. patient was admitted from the emergency department for 

infectious colitis. 


 Vital Signs











 Period  Temp  Pulse  Resp  BP Sys/Hernandez  Pulse Ox


 


 Last 24 Hr  97.8 F-99.0 F  64-75  17-19  123-152/72-89  











GENERAL: The patient is awake, alert, and fully oriented, in no acute distress.


HEAD: Normal with no signs of trauma.


EYES: PERRL, extraocular movements intact, sclera anicteric, conjunctiva clear. 

No ptosis. 


ENT: Ears normal, nares patent, oropharynx clear without exudates, moist mucous 


membranes.


NECK: Trachea midline, full range of motion, supple. 


LUNGS: Breath sounds equal, clear to auscultation bilaterally, no wheezes, no 

crackles, no 


accessory muscle use. 


HEART: Regular rate and rhythm, S1, S2 without murmur, rub or gallop.


ABDOMEN: Soft, + Left lower quadrant tenderness,  nondistended, normoactive 

bowel sounds, no guarding, no 


rebound, no hepatosplenomegaly, no masses.


EXTREMITIES: 2+ pulses, warm, well-perfused, no edema. 


NEUROLOGICAL: Cranial nerves II through XII grossly intact. Normal speech, gait 

not 


observed.


PSYCH: Normal mood, normal affect.


SKIN: Warm, dry, normal turgor, no rashes or lesions noted














 Laboratory Results - last 24 hr











  09/10/18 09/13/18 09/13/18





  13:00 07:15 07:15


 


WBC   7.6 


 


RBC   3.47 L 


 


Hgb   10.5 L 


 


Hct   31.7 L 


 


MCV   91.4 


 


MCH   30.2 


 


MCHC   33.0 


 


RDW   11.7 


 


Plt Count   233 


 


MPV   7.6 


 


Absolute Neuts (auto)   4.9 


 


Neutrophils %   63.4 


 


Lymphocytes %   24.1 


 


Monocytes %   9.0 


 


Eosinophils %   3.1 


 


Basophils %   0.4 


 


Sodium    137


 


Potassium    4.0


 


Chloride    104


 


Carbon Dioxide    25


 


Anion Gap    8


 


BUN    6 L


 


Creatinine    0.9


 


Creat Clearance w eGFR    > 60


 


Random Glucose    118 H


 


Calcium    8.5


 


Phosphorus    3.1  D


 


Magnesium    2.0


 


Stool O & P Wet Mount    


 


O & P Permanent Slide  Final report  








Active Medications











Generic Name Dose Route Start Last Admin





  Trade Name Freq  PRN Reason Stop Dose Admin


 


Enoxaparin Sodium  40 mg  09/10/18 10:00  09/12/18 09:47





  Lovenox -  SQ   Not Given





  DAILY ERLIN   





     





     





     





     


 


Hydromorphone HCl  1 mg  09/09/18 21:17  09/13/18 06:31





  Dilaudid Injection -  IVPUSH   1 mg





  Q4H PRN   Administration





  PAIN LEVEL 7 - 10   





     





     





     


 


Metronidazole  250 mg in 50 mls @ 50 mls/hr  09/10/18 02:00  09/13/18 01:14





  Flagyl 250mg Premixed Ivpb -  IVPB   50 mls/hr





  Q8H-IV ERLIN   Administration





     





     





     





     


 


Piperacillin Sod/Tazobactam Sod  4.5 gm in 100 mls @ 200 mls/hr  09/10/18 10:30

  09/13/18 02:10





  Zosyn 4.5gm Ivpb (Pre-Docked)  IVPB   200 mls/hr





  Q8H-IV ERLIN   Administration





     





     





  Protocol   





     


 


Dextrose/Sodium Chloride  20 meq in 1,000 mls @ 75 mls/hr  09/11/18 13:59  09/12 /18 17:22





  Dextrose 5%-Normal Saline+20 Meq Kcl -  IV   75 mls/hr





  ASDIR ERLIN   Administration





     





     





     





     


 


Lactobacillus Acidophilus  1 tab  09/10/18 13:00  09/12/18 09:55





  Bacid -  PO   1 tab





  DAILY ERLIN   Administration





     





     





     





     


 


Ondansetron HCl  4 mg  09/09/18 18:23  





  Zofran Injection  IVPUSH   





  Q6H PRN   





  NAUSEA   





     





     





     


 


Pantoprazole Sodium  40 mg  09/10/18 14:15  09/12/18 09:54





  Protonix Iv  IVPUSH   40 mg





  DAILY ERLIN   Administration





     





     





     





     


 


Ramipril  5 mg  09/12/18 10:00  09/12/18 09:55





  Altace -  PO   5 mg





  DAILY ERLIN   Administration





     





     





     





     


 


Ramipril  10 mg  09/11/18 22:00  09/12/18 21:06





  Altace -  PO   10 mg





  HS ERLIN   Administration





     





     





     





     








 Microbiology











 09/11/18 20:00 Norovirus GI - Preliminary





 Stool Norovirus GII - Preliminary


 


 09/09/18 19:05 Blood Culture - Preliminary





 Blood - Peripheral Venous    NO GROWTH OBTAINED AFTER 72 HOURS, INCUBATION TO 

CONTINUE





    FOR 2 DAYS.


 


 09/09/18 18:40 Blood Culture - Preliminary





 Blood - Peripheral Venous    NO GROWTH OBTAINED AFTER 72 HOURS, INCUBATION TO 

CONTINUE





    FOR 2 DAYS.


 


 09/10/18 13:00 Gram Stain - Final





 Stool 


 


 09/10/18 13:00 Salmonella/Shigella Culture - Final





 Stool    NO GROWTH OF SALMONELLA OR SHIGELLA SPECIES OBTAINED





 Campylobacter Culture - Final





    NO GROWTH OF CAMPYLOBACTER SPECIES OBTAINED





 Yersinia Culture - Final





    NO GROWTH OF YERSINIA SPECIES OBTAINED





 Vibrio Culture - Final





 Escherichia coli 0157 Culture - Final





    NO GROWTH OF E COLI 0157 OBTAINED











IMAGING


CT of abdomen and pelvis with IV contrast diffuse fatty infiltration of liver 

thickening or irregularity of the left colon consistent with colitis





ASSESSMENT/PLAN:


1) Colitis


- leukocytosis resolved,  continue Zosyn (9/10 - ) and Flagyl (9/10 -), stool 

cultures Negative, pending norovirus


- pending repeat ct scan of abd/pelvis with iv contrast, pt is unable to 

tolerate po contrast 


- GI Dr Gomez consulted and followed


-ID, Dr Leigh consulted and following  





2) cardiovascular


HTN


- bp at goal, continue  home dose ramipril, close monitoring 





DVT PPX


   - lovenox sq


   - scd 








FEN


   - d5ns w/20meq kci @ 75ml/hr  


   - BMP in am


   - clear liquid diet 





Dispo: pt currently requires further inpatient management of her emergent 

condition.





Visit type





- Emergency Visit


Emergency Visit: Yes


ED Registration Date: 09/09/18


Care time: The patient presented to the Emergency Department on the above date 

and was hospitalized for further evaluation of their emergent condition.





- New Patient


This patient is new to me today: No





- Critical Care


Critical Care patient: No





- Discharge Referral


Referred to [facility] No

## 2018-09-14 LAB
ANION GAP SERPL CALC-SCNC: 10 MMOL/L (ref 8–16)
BASOPHILS # BLD: 1 % (ref 0–2)
BUN SERPL-MCNC: 6 MG/DL (ref 7–18)
CALCIUM SERPL-MCNC: 9 MG/DL (ref 8.4–10.2)
CHLORIDE SERPL-SCNC: 104 MMOL/L (ref 98–107)
CO2 SERPL-SCNC: 25 MMOL/L (ref 22–28)
CREAT SERPL-MCNC: 1 MG/DL (ref 0.6–1.3)
DEPRECATED RDW RBC AUTO: 11.6 % (ref 11.6–15.6)
EOSINOPHIL # BLD: 3.6 % (ref 0–4.5)
GLUCOSE SERPL-MCNC: 100 MG/DL (ref 74–106)
HCT VFR BLD CALC: 35.7 % (ref 32.4–45.2)
HGB BLD-MCNC: 12 GM/DL (ref 10.7–15.3)
LYMPHOCYTES # BLD: 28.8 % (ref 8–40)
MAGNESIUM SERPL-MCNC: 2 MG/DL (ref 1.8–2.4)
MCH RBC QN AUTO: 30.4 PG (ref 25.7–33.7)
MCHC RBC AUTO-ENTMCNC: 33.5 G/DL (ref 32–36)
MCV RBC: 90.7 FL (ref 80–96)
MONOCYTES # BLD AUTO: 9.3 % (ref 3.8–10.2)
NEUTROPHILS # BLD: 57.3 % (ref 42.8–82.8)
PHOSPHATE SERPL-MCNC: 3.3 MG/DL (ref 2.5–4.6)
PLATELET # BLD AUTO: 253 K/MM3 (ref 134–434)
PMV BLD: 7.2 FL (ref 7.5–11.1)
POTASSIUM SERPLBLD-SCNC: 3.9 MMOL/L (ref 3.5–5.1)
RBC # BLD AUTO: 3.94 M/MM3 (ref 3.6–5.2)
SODIUM SERPL-SCNC: 139 MMOL/L (ref 136–145)
WBC # BLD AUTO: 7.2 K/MM3 (ref 4–10.8)

## 2018-09-14 RX ADMIN — RAMIPRIL SCH MG: 5 CAPSULE ORAL at 21:20

## 2018-09-14 RX ADMIN — HYDROMORPHONE HYDROCHLORIDE PRN MG: 2 INJECTION, SOLUTION INTRAMUSCULAR; INTRAVENOUS; SUBCUTANEOUS at 21:24

## 2018-09-14 RX ADMIN — ENOXAPARIN SODIUM SCH: 40 INJECTION SUBCUTANEOUS at 10:38

## 2018-09-14 RX ADMIN — RAMIPRIL SCH MG: 5 CAPSULE ORAL at 10:32

## 2018-09-14 RX ADMIN — PANTOPRAZOLE SODIUM SCH MG: 40 INJECTION, POWDER, FOR SOLUTION INTRAVENOUS at 10:33

## 2018-09-14 RX ADMIN — PIPERACILLIN AND TAZOBACTAM SCH MLS/HR: 4; .5 INJECTION, POWDER, LYOPHILIZED, FOR SOLUTION INTRAVENOUS at 01:45

## 2018-09-14 RX ADMIN — PIPERACILLIN AND TAZOBACTAM SCH MLS/HR: 4; .5 INJECTION, POWDER, LYOPHILIZED, FOR SOLUTION INTRAVENOUS at 10:33

## 2018-09-14 RX ADMIN — HYDROMORPHONE HYDROCHLORIDE PRN MG: 2 INJECTION, SOLUTION INTRAMUSCULAR; INTRAVENOUS; SUBCUTANEOUS at 01:52

## 2018-09-14 RX ADMIN — ENOXAPARIN SODIUM SCH MG: 40 INJECTION SUBCUTANEOUS at 10:32

## 2018-09-14 RX ADMIN — CIPROFLOXACIN HYDROCHLORIDE SCH MG: 250 TABLET, FILM COATED ORAL at 14:17

## 2018-09-14 RX ADMIN — CIPROFLOXACIN HYDROCHLORIDE SCH MG: 250 TABLET, FILM COATED ORAL at 21:21

## 2018-09-14 RX ADMIN — Medication SCH TAB: at 10:32

## 2018-09-14 NOTE — PN
Progress Note, Physician


History of Present Illness: 


Feeling better 


Reports less abdominal pain


No more bloody BMs


Afebrile   WBC  WNL


BC  no growth


Stool cultures no growth


C diff (-)


Viral studies and O&P pending





- Current Medication List


Current Medications: 


Active Medications





Ciprofloxacin (Cipro (Restricted To Id))  500 mg PO BID [facility]


Enoxaparin Sodium (Lovenox -)  40 mg SQ DAILY [facility]


   Last Admin: 09/14/18 10:38 Dose:  Not Given


Hydromorphone HCl (Dilaudid Injection -)  1 mg IVPUSH Q4H PRN


   PRN Reason: PAIN LEVEL 7 - 10


   Last Admin: 09/14/18 01:52 Dose:  1 mg


Lactobacillus Acidophilus (Bacid -)  1 tab PO DAILY [facility]


   Last Admin: 09/14/18 10:32 Dose:  1 tab


Metronidazole (Flagyl -)  500 mg PO TID [facility]


Ondansetron HCl (Zofran Injection)  4 mg IVPUSH Q6H PRN


   PRN Reason: NAUSEA


Pantoprazole Sodium (Protonix Iv)  40 mg IVPUSH DAILY [facility]


   Last Admin: 09/14/18 10:33 Dose:  40 mg


Ramipril (Altace -)  5 mg PO DAILY [facility]


   Last Admin: 09/14/18 10:32 Dose:  5 mg


Ramipril (Altace -)  10 mg PO HS [facility]


   Last Admin: 09/13/18 21:26 Dose:  10 mg











- Objective


Vital Signs: 


 Vital Signs











Temperature  98.3 F   09/14/18 05:29


 


Pulse Rate  72   09/14/18 05:29


 


Respiratory Rate  16   09/14/18 09:00


 


Blood Pressure  141/93   09/14/18 05:29


 


O2 Sat by Pulse Oximetry (%)  98   09/14/18 09:00











Constitutional: Yes: No Distress


Eyes: Yes: Conjunctiva Clear


Cardiovascular: Yes: Regular Rate and Rhythm, S1, S2


Respiratory: Yes: CTA Bilaterally


Gastrointestinal: Yes: Normal Bowel Sounds, Soft.  No: Tenderness


Edema: No


Labs: 


 CBC, BMP





 09/14/18 07:20 





 09/14/18 07:20 











Assessment/Plan





L colitis   clinically improved    Stool c/s (-)


Low grade fever/ leukocytosis   improved


 


May substitute cipro 500mg po bid + flagyl 500mg po tid x 7d


Outpatient GI follow up

## 2018-09-14 NOTE — PN
Physical Exam: 


SUBJECTIVE: Patient seen and examined. Continues to have diarrhea and abdominal 

cramping. No hematochezia or chills. Complained of headache this morning which 

was relieved with Fioricet.








OBJECTIVE:





 Vital Signs











 Period  Temp  Pulse  Resp  BP Sys/Hernandez  Pulse Ox


 


 Last 24 Hr  98.0 F-98.6 F  63-72  18-18  136-153/83-96  94-99











GENERAL: The patient is awake, alert, and fully oriented, in no acute distress.


HEAD: Normal with no signs of trauma.


EYES: PERRL, extraocular movements intact, sclera anicteric, conjunctiva clear. 

No ptosis. 


ENT: Ears normal, nares patent, oropharynx clear without exudates, moist mucous 


membranes.


NECK: Trachea midline, full range of motion, supple. 


LUNGS: Breath sounds equal, clear to auscultation bilaterally, no wheezes, no 

crackles, no 


accessory muscle use. 


HEART: Regular rate and rhythm, S1, S2 without murmur, rub or gallop.


ABDOMEN: Soft, diffusely tender most notably in LLQ, nondistended, normoactive 

bowel sounds, no guarding, no 


rebound, no hepatosplenomegaly, no masses.


EXTREMITIES: 2+ pulses, warm, well-perfused, no edema. 


NEUROLOGICAL: Cranial nerves II through XII grossly intact. Normal speech, gait 

not 


observed.


PSYCH: Normal mood, normal affect.


SKIN: Warm, dry, normal turgor, no rashes or lesions noted














Active Medications











Generic Name Dose Route Start Last Admin





  Trade Name Freq  PRN Reason Stop Dose Admin


 


Enoxaparin Sodium  40 mg  09/10/18 10:00  09/13/18 09:24





  Lovenox -  SQ   Not Given





  DAILY ERLIN   





     





     





     





     


 


Hydromorphone HCl  1 mg  09/09/18 21:17  09/14/18 01:52





  Dilaudid Injection -  IVPUSH   1 mg





  Q4H PRN   Administration





  PAIN LEVEL 7 - 10   





     





     





     


 


Metronidazole  250 mg in 50 mls @ 50 mls/hr  09/10/18 02:00  09/14/18 02:26





  Flagyl 250mg Premixed Ivpb -  IVPB   50 mls/hr





  Q8H-IV ERLIN   Administration





     





     





     





     


 


Piperacillin Sod/Tazobactam Sod  4.5 gm in 100 mls @ 200 mls/hr  09/10/18 10:30

  09/14/18 01:45





  Zosyn 4.5gm Ivpb (Pre-Docked)  IVPB   200 mls/hr





  Q8H-IV ERLIN   Administration





     





     





  Protocol   





     


 


Lactobacillus Acidophilus  1 tab  09/10/18 13:00  09/13/18 09:24





  Bacid -  PO   1 tab





  DAILY ERLIN   Administration





     





     





     





     


 


Ondansetron HCl  4 mg  09/09/18 18:23  





  Zofran Injection  IVPUSH   





  Q6H PRN   





  NAUSEA   





     





     





     


 


Pantoprazole Sodium  40 mg  09/10/18 14:15  09/13/18 09:24





  Protonix Iv  IVPUSH   40 mg





  DAILY ELRIN   Administration





     





     





     





     


 


Ramipril  5 mg  09/12/18 10:00  09/13/18 09:25





  Altace -  PO   5 mg





  DAILY ERLIN   Administration





     





     





     





     


 


Ramipril  10 mg  09/11/18 22:00  09/13/18 21:26





  Altace -  PO   10 mg





  HS ERLIN   Administration





     





     





     





     








 CBCD











WBC  7.2 K/mm3 (4.0-10.8)   09/14/18  07:20    


 


RBC  3.94 M/mm3 (3.60-5.2)   09/14/18  07:20    


 


Hgb  12.0 GM/dl (10.7-15.3)   09/14/18  07:20    


 


Hct  35.7 % (32.4-45.2)   09/14/18  07:20    


 


MCV  90.7 fl (80-96)   09/14/18  07:20    


 


MCHC  33.5 g/dl (32.0-36.0)   09/14/18  07:20    


 


RDW  11.6 % (11.6-15.6)   09/14/18  07:20    


 


Plt Count  253 K/MM3 (134-434)   09/14/18  07:20    


 


MPV  7.2 fl (7.5-11.1)  L  09/14/18  07:20    








 CMP











Sodium  139 mmol/L (136-145)   09/14/18  07:20    


 


Potassium  3.9 mmol/L (3.5-5.1)   09/14/18  07:20    


 


Chloride  104 mmol/L ()   09/14/18  07:20    


 


Carbon Dioxide  25 mmol/L (22-28)   09/14/18  07:20    


 


Anion Gap  10 MMOL/L (8-16)   09/14/18  07:20    


 


BUN  6 mg/dl (7-18)  L  09/14/18  07:20    


 


Creatinine  1.0 mg/dl (0.6-1.3)   09/14/18  07:20    


 


Creat Clearance w eGFR  57.35  (>60)   09/14/18  07:20    


 


Calcium  9.0 mg/dl (8.4-10.2)   09/14/18  07:20    


 


Total Bilirubin  0.8 mg/dl (0.2-1.0)   09/11/18  08:30    


 


AST  15 U/L (10-42)  D 09/11/18  08:30    


 


ALT  16 U/L (10-40)  D 09/11/18  08:30    


 


Alkaline Phosphatase  40 U/L (32-92)  D 09/11/18  08:30    


 


Total Protein  5.7 g/dl (6.4-8.3)  L  09/11/18  08:30    


 


Albumin  3.3 g/dl (3.5-5.0)  L  09/11/18  08:30    














IMAGING:


CTAP 9/13 Shows little change in diffuse left-sided colitis. Development of devin-

fluid within the cul-de-sac. Bilat pleural effusions, R>L.





ASSESSMENT/PLAN:


1) Colitis


- CTAP without improvement


- Continues to have abdominal cramping and diarrhea, no hematochezia


- Stool cultures negative, O&P neg


- Leukocytosis resolved


- Received Zosyn (9/10 - ) and Flagyl (9/10 -), per GI/ID ok to change to Cipro 

500mg bid and Flagyl 500mg tid starting today


- GI and ID following


- Discussed with Dr. Gomez and states ongoing symptoms expected, but requests 

that we review case again with surgery today - Dr. Silvia nichole


- Plan to advance to soft, low-residue, lactose-free diet





2) HTN


- BP near goal, continue home Ramimpril





3) Pleural effusions


- Asymptomatic





4) Ppx


- Lovenox





5) F/E/N


- Continue IVF


-Advance diet as above





Dispo: Plan for dc 9/15; please text Dr. Gomez at [phone] prior to 

verify. 





Visit type





- Emergency Visit


Emergency Visit: Yes


ED Registration Date: 09/09/18


Care time: The patient presented to the Emergency Department on the above date 

and was hospitalized for further evaluation of their emergent condition.





- New Patient


This patient is new to me today: Yes


Date on this admission: 09/14/18





- Critical Care


Critical Care patient: No

## 2018-09-15 VITALS — DIASTOLIC BLOOD PRESSURE: 96 MMHG | SYSTOLIC BLOOD PRESSURE: 158 MMHG | TEMPERATURE: 98.1 F | HEART RATE: 72 BPM

## 2018-09-15 RX ADMIN — Medication SCH TAB: at 10:32

## 2018-09-15 RX ADMIN — RAMIPRIL SCH MG: 5 CAPSULE ORAL at 10:31

## 2018-09-15 RX ADMIN — PANTOPRAZOLE SODIUM SCH MG: 40 INJECTION, POWDER, FOR SOLUTION INTRAVENOUS at 10:32

## 2018-09-15 RX ADMIN — CIPROFLOXACIN HYDROCHLORIDE SCH MG: 250 TABLET, FILM COATED ORAL at 10:32

## 2018-09-15 RX ADMIN — ENOXAPARIN SODIUM SCH: 40 INJECTION SUBCUTANEOUS at 10:32

## 2018-09-15 NOTE — DS
Physical Examination


Vital Signs: 


 Vital Signs











Temperature  98.1 F   09/15/18 06:00


 


Pulse Rate  72   09/15/18 06:00


 


Respiratory Rate  19   09/15/18 06:00


 


Blood Pressure  158/96   09/15/18 06:00


 


O2 Sat by Pulse Oximetry (%)  99   09/15/18 06:00











Findings/Remarks: 





General: NAD, A&Ox3


Lungs: CTA bilaterally


Heart: RRR, S1S2


Abd: LLQ mild tenderness. Soft, non-distended. Normoactive bowel sounds


Ext: Warm, well-perfused. No edema


Labs: 


 CBC, BMP





 09/14/18 07:20 





 09/14/18 07:20 











Discharge Summary


Reason For Visit: BACTERIAL COLITIS


Current Active Problems





Bacterial colitis (Acute)


Colitis (Acute)








Hospital Course: 





This is a 58 year old female with PMHx of diverticulosis, HTN who presented to 

the ED with LLQ pain and was found to have colitis. 


The patient was treated conservatively on IV Zosyn. Surgical evaluation was 

obtained and no surgical interventions at this time. GI evaluated and stool 

cultures were obtained with no growth, c.diff negative. The patient's diet was 

advanced and she is tolerating it well. The patient still has complaints of 

small amount of diarrhea and mild LLQ pain with gas, however she is reporting 

her symptoms are improving. 





The patient was discharged and instructed to follow-up with her pcp and GI. 





Off note, 2+ blood in urine was noted. At the time the patient also had blood 

in stool which may have been cross contamination. Patient has had blood in 

urine since 2012. Instructed the patient to follow-up with pcp within 1 week to 

have repeat UA to assess for blood in urine. 


Condition: Improved





- Instructions


Diet, Activity, Other Instructions: 


Please return to the ED with new, persistent, or worsening symptoms. 





Antibiotics: 


1) Ciprofloxacin 500mg twice a day for 7 days (your first dose will be this 

evening)


2) Flagyl 500mg every 8 hours for 7 days (your first dose will be at 2pm today)





You MUST complete the full course of antibiotics even if you are feeling 

better. Do not stop your antibiotics abruptly. 





Diet: 


Low residue diet (please see attached handout). You may start with a bland diet 

(BRAT: Banana, Rice, Apple, Toast) and slowly add foods as tolerated. 





Referrals: 


Madison Gomez,  [Staff Physician] -  (Please follow-up with Dr. Gomez 

within 1 week. )


Miguel Benjamin MD [Staff Physician] -  (Please follow-up with Dr. Benjamin in 1 

week to have your blood pressure checked and also to have a repeat urinalysis (

UA) to assess the blood you had in your urine while you were here in the 

hospital. )


Disposition: HOME





- Home Medications


Comprehensive Discharge Medication List: 


Ambulatory Orders





Esomeprazole Magnesium [Nexium 24Hr] 20 mg PO DAILY 09/09/18 


Ramipril 10 mg PO HS 09/10/18 


Ciprofloxacin [Cipro -] 500 mg PO BID #13 tablet 09/15/18 


Fluticasone Prop 0.05% Nasal [Flonase -] 1 spray NS DAILY #1 spray 09/15/18 


Lactobacillus Acidophilus [Bacid -] 1 tab PO DAILY #14 tab 09/15/18 


Ondansetron [Zofran Odt -] 4 mg SL Q8H PRN #14 od.tablet 09/15/18 


metroNIDAZOLE [Flagyl -] 500 mg PO TID #23 tablet 09/15/18 








This patient is new to me today: Yes


Date on this admission: 09/15/18


Emergency Visit: Yes


ED Registration Date: 09/09/18


Care time: The patient presented to the Emergency Department on the above date 

and was hospitalized for further evaluation of their emergent condition.


Critical Care patient: No





- Discharge Referral


Referred to [facility] Yes


Physician Referral: Miguel Benjamin MD (Int Med)

## 2018-09-26 ENCOUNTER — HOSPITAL ENCOUNTER (INPATIENT)
Dept: HOSPITAL 74 - FER | Age: 58
LOS: 4 days | Discharge: HOME | DRG: 373 | End: 2018-09-30
Attending: NURSE PRACTITIONER | Admitting: INTERNAL MEDICINE
Payer: COMMERCIAL

## 2018-09-26 VITALS — BODY MASS INDEX: 31.6 KG/M2

## 2018-09-26 DIAGNOSIS — K57.30: ICD-10-CM

## 2018-09-26 DIAGNOSIS — K29.70: ICD-10-CM

## 2018-09-26 DIAGNOSIS — R31.9: ICD-10-CM

## 2018-09-26 DIAGNOSIS — I10: ICD-10-CM

## 2018-09-26 DIAGNOSIS — E87.6: ICD-10-CM

## 2018-09-26 DIAGNOSIS — A04.72: Primary | ICD-10-CM

## 2018-09-26 LAB
ALBUMIN SERPL-MCNC: 4.1 G/DL (ref 3.5–5)
ALP SERPL-CCNC: 50 U/L (ref 32–92)
ALT SERPL-CCNC: 24 U/L (ref 10–40)
ANION GAP SERPL CALC-SCNC: 9 MMOL/L (ref 8–16)
AST SERPL-CCNC: 21 U/L (ref 10–42)
BACTERIA #/AREA URNS HPF: (no result) /HPF
BILIRUB SERPL-MCNC: 1.1 MG/DL (ref 0.2–1)
BUN SERPL-MCNC: 14 MG/DL (ref 7–18)
CALCIUM SERPL-MCNC: 8.9 MG/DL (ref 8.4–10.2)
CHLORIDE SERPL-SCNC: 105 MMOL/L (ref 98–107)
CO2 SERPL-SCNC: 25 MMOL/L (ref 22–28)
CREAT SERPL-MCNC: 0.8 MG/DL (ref 0.6–1.3)
DEPRECATED RDW RBC AUTO: 11.7 % (ref 11.6–15.6)
GLUCOSE SERPL-MCNC: 113 MG/DL (ref 74–106)
HCT VFR BLD CALC: 37.5 % (ref 32.4–45.2)
HGB BLD-MCNC: 12.8 GM/DL (ref 10.7–15.3)
MCH RBC QN AUTO: 30.5 PG (ref 25.7–33.7)
MCHC RBC AUTO-ENTMCNC: 34 G/DL (ref 32–36)
MCV RBC: 89.5 FL (ref 80–96)
PH UR: 5.5 [PH] (ref 4.5–8)
PLATELET # BLD AUTO: 255 K/MM3 (ref 134–434)
PLATELET BLD QL SMEAR: ADEQUATE
PMV BLD: 7.4 FL (ref 7.5–11.1)
POTASSIUM SERPLBLD-SCNC: 3 MMOL/L (ref 3.5–5.1)
PROT SERPL-MCNC: 6.9 G/DL (ref 6.4–8.3)
RBC # BLD AUTO: 4.19 M/MM3 (ref 3.6–5.2)
SODIUM SERPL-SCNC: 139 MMOL/L (ref 136–145)
SP GR UR: 1.02 (ref 1–1.02)
UROBILINOGEN UR STRIP-MCNC: 0.2 MG/DL (ref 0.2–1)
WBC # BLD AUTO: 14.6 K/MM3 (ref 4–10.8)
WBC # UR AUTO: (no result) /UL (ref 0–5)

## 2018-09-26 PROCEDURE — G0378 HOSPITAL OBSERVATION PER HR: HCPCS

## 2018-09-26 NOTE — PDOC
Attending Attestation





- Resident


Resident Name: Bg Salas





- ED Attending Attestation


I have performed the following: I have examined & evaluated the patient, The 

case was reviewed & discussed with the resident, I agree w/resident's findings 

& plan, Exceptions are as noted





- HPI


HPI: 





10/03/18 07:19


Is described by the resident, the patient was recently hospitalized for acute 

colitis and treated with metronidazole and Levaquin. She was discharged 

approximately one week ago. No last few days, her symptoms have recurred. In 

addition she has felt febrile, with fatigue, malaise, and myalgias. Occasional 

watery diarrhea is present. Mild nausea but no vomiting, hematemesis, melena, 

bloody stool





- Physicial Exam


PE: 





10/03/18 07:20


Physical exam shows a low-grade fever, but is otherwise negative. Specifically, 

abdomen is nondistended. Bowel sounds are normal. Soft without mass or 

organomegaly. Mild diffuse tenderness without localization, no guarding or 

rebound.





- Medical Decision Making





10/03/18 07:21


Assessment: The differential is between recurrence of the acute colitis and the 

possible development of C. difficile. The C. difficile specimen was sent 

yesterday but could not be processed by the lab. This will have to be repeated. 


10/03/18 07:21


The patient is gastroenterologist has been consulted by phone. She will see the 

patient within the next 30 minutes in the ER and facilitate admission, further 

diagnosis and medical therapy. The patient is relatively comfortable with 

supportive treatment of IV fluids and Zofran.

## 2018-09-26 NOTE — PDOC
History of Present Illness





- General


Chief Complaint: Cold Symptoms


Stated Complaint: FEVER, BODY ACHES


Time Seen by Provider: 18 16:06





- History of Present Illness


Initial Comments: 





18 16:20


57 yo F w a hx of recent severe bacterial colitis, hypertension and 

diverticulosis without documented diverticulitis is here with 12 hours of high 

fever, sweats, chills, and profuse watery diarrhea. She has had around 5-10 

episodes of soft stool today, non-bloody. She recently finished a course of 

ciprofloxacin and metronidazole for her bacterial colitis. She also reports 

some mild LLQ abdominal pain consistent with her usual abdominal pain. She 

denies any nausea or vomiting. She denies dysuria, frequency, or urgency.  She 

had a C-Diff test done on her yesterday at [facility] but the results have not 

yet been made available. 














Past History





- Past Medical History


Allergies/Adverse Reactions: 


 Allergies











Allergy/AdvReac Type Severity Reaction Status Date / Time


 


linaclotide [From Linzess] AdvReac Intermediate diarrhea Verified 18 15:29











Home Medications: 


Ambulatory Orders





RX: Esomeprazole Magnesium [Nexium 24Hr] 20 mg PO DAILY 18 


RX: Ramipril 10 mg PO HS 09/10/18 








Anemia: No


Asthma: No


Cancer: No


Cardiac Disorders: No


CVA: No


COPD: No


CHF: No


DVT: No


Dementia: No


Diabetes: No


GI Disorders: Yes (IBS, GERD)


 Disorders: No


HTN: Yes (ON MEDICATION)


Hypercholesterolemia: Yes


Liver Disease: No


Seizures: No


Thyroid Disease: No





- Surgical History


Abdominal Surgery: Yes ( 2)


Appendectomy: No


Cardiac Surgery: No


Cholecystectomy: No


Lung Surgery: No


Neurologic Surgery: No


Orthopedic Surgery: No





- Suicide/Smoking/Psychosocial Hx


Smoking History: Never smoked


Have you smoked in the past 12 months: No


Hx Alcohol Use: Yes


Drug/Substance Use Hx: No


Substance Use Type: Alcohol


Hx Substance Use Treatment: No





**Review of Systems





- Review of Systems


Comments:: 





18 16:42





CONSTITUTIONAL:


Present: fever, chills, diaphoresis, generalized weakness, malaise, loss of 

appetite


HEENT:


Absent: rhinorrhea, nasal congestion, throat pain, throat swelling, difficulty 

swallowing,


mouth swelling, ear pain, eye pain, visual Changes


CARDIOVASCULAR:


Absent: chest pain, syncope, palpitations, irregular heart rate, lightheadedness

, peripheral


edema


RESPIRATORY:


Absent: cough, shortness of breath, dyspnea with exertion, orthopnea, wheezing, 

stridor,


hemoptysis


GASTROINTESTINAL:


Present: abdominal pain, abdominal distension, nausea, vomiting, diarrhea


Absent: constipation, melena, hematochezia


GENITOURINARY:


Absent: dysuria, frequency, urgency, hesitancy, hematuria, flank pain, genital 

pain


MUSCULOSKELETAL:


Absent: myalgia, arthralgia, joint swelling


SKIN:


Absent: rash, itching, pallor


HEMATOLOGIC/IMMUNOLOGIC:


Absent: easy bleeding, easy bruising, lymphadenopathy, frequent infections


ENDOCRINE:


Absent: unexplained weight gain, unexplained weight loss, heat intolerance, 

cold intolerance


NEUROLOGIC:


Absent: headache, focal weakness or paresthesias, dizziness, unsteady gait, 

seizure, mental


status changes, bladder or bowel incontinence


PSYCHIATRIC:


Absent: anxiety, depression, suicidal or homicidal ideation, hallucinations.





*Physical Exam





- Vital Signs


 Last Vital Signs











Temp Pulse Resp BP Pulse Ox


 


 100.0 F H  107 H  18   133/82   98 


 


 18 15:28  18 15:28  18 15:28  18 15:28  18 15:28














- Physical Exam


General Appearance: Yes: Nourished, Moderate Distress, Obese


HEENT: positive: EOMI, AGNES, Normal ENT Inspection, Normal Voice, Pharynx Normal


Neck: positive: Supple.  negative: Tender, Decreased range of motion, 

Lymphadenopathy (R), Lymphadenopathy (L)


Respiratory/Chest: positive: Lungs Clear, Normal Breath Sounds.  negative: 

Chest Tender, Respiratory Distress, Accessory Muscle Use


Cardiovascular: positive: Regular Rhythm, S1, S2, Tachycardia.  negative: Edema

, JVD, Murmur


Vascular Pulses: Dorsalis-Pedis (R): 2+, Doralis-Pedis (L): 2+


Gastrointestinal/Abdominal: positive: Normal Bowel Sounds, Soft.  negative: 

Distended, Guarding, Rebound, Tenderness, Hernia


Musculoskeletal: positive: Normal Inspection.  negative: CVA Tenderness, 

Decreased Range of Motion, Vertebral Tenderness


Extremity: positive: Normal Capillary Refill, Normal Inspection, Normal Range 

of Motion


Integumentary: positive: Dry, Warm, Pale


Neurologic: positive: CNs II-XII NML intact, Fully Oriented, Alert, Normal Mood/

Affect, Normal Response, Motor Strength 5/5.  negative: Confused, Disoriented





ED Treatment Course





- LABORATORY


CBC & Chemistry Diagram: 


 18 07:30





 18 07:30





- Medications


Given in the ED: 


ED Medications














Discontinued Medications














Generic Name Dose Route Start Last Admin





  Trade Name Elroy  PRN Reason Stop Dose Admin


 


Acetaminophen  1,000 mg  18 15:43  18 15:49





  Tylenol -  PO  18 15:44  1,000 mg





  ONCE ONE   Administration





     





     





     





     














Medical Decision Making





- Medical Decision Making





18 16:46


57 yo F w a hx of recent severe bacterial colitis, hypertension and 

diverticulosis without documented diverticulitis is here with 12 hours of high 

fever, sweats, chills, and profuse watery diarrhea after recently being 

discharged from the hospital and finishing a course of cipro.





Highest on DD: C-diff, recurrent colitis, diverticulitis, bacterial toxin 

producing illness. 





Plan: Labs, urine, c-diff testing, IV hydration, Zofran, acetaminophen, re-

assess. 





We consulted Dr. Lancaster - the patient's gastroenterologist. She is currently 

at [facility] and said she would come check out the patient here at [facility]. We 

are considering doing repeat imaging to look for a possible abscess. 





Dr. lancaster came and requested the patient receive a new CTAP w contrast, 

another IV tylenol, start antibiotics with flagyl and levoquin, and get a flu 

swab. 


Patient will be signed out to night team. 








18 18:49








*DC/Admit/Observation/Transfer


Diagnosis at time of Disposition: 


 Colitis








- Discharge Dispostion


Condition at time of disposition: Stable


Decision to Admit order: Yes





- Referrals





- Patient Instructions





- Post Discharge Activity

## 2018-09-26 NOTE — PDOC
*Physical Exam





- Vital Signs


 Last Vital Signs











Temp Pulse Resp BP Pulse Ox


 


 100.8 F H  112 H  18   123/87   97 


 


 09/26/18 18:42  09/26/18 18:35  09/26/18 18:35  09/26/18 18:35  09/26/18 18:35














ED Treatment Course





- LABORATORY


CBC & Chemistry Diagram: 


 09/26/18 16:11





 09/26/18 16:11





- ADDITIONAL ORDERS


Additional order review: 


 Laboratory  Results











  09/26/18 09/26/18 09/26/18





  17:28 16:11 16:11


 


Sodium    139


 


Potassium    3.0 L D


 


Chloride    105


 


Carbon Dioxide    25


 


Anion Gap    9


 


BUN    14


 


Creatinine    0.8


 


Creat Clearance w eGFR    > 60


 


Random Glucose    113 H


 


Lactic Acid   1.9 


 


Calcium    8.9


 


Total Bilirubin    1.1 H


 


AST    21  D


 


ALT    24  D


 


Alkaline Phosphatase    50  D


 


Total Protein    6.9


 


Albumin    4.1


 


Urine Color  Yellow  


 


Urine Appearance  Clear  


 


Urine pH  5.5  


 


Ur Specific Gravity  1.020  


 


Urine Protein  Trace  


 


Urine Glucose (UA)  Negative  


 


Urine Ketones  Negative  


 


Urine Blood  3+ H  


 


Urine Nitrite  Negative  


 


Urine Bilirubin  Negative  


 


Urine Urobilinogen  0.2  


 


Ur Leukocyte Esterase  Negative  


 


Urine RBC  10-20  


 


Urine WBC  0-2  


 


Urine Bacteria  Few  




















 09/26/18 18:53 Influenza Types A,B Antigen - Preliminary





 Nasopharyngeal Swab  - Preliminary








 











  09/26/18





  16:11


 


RBC  4.19


 


MCV  89.5


 


MCHC  34.0


 


RDW  11.7


 


MPV  7.4 L


 


Neutrophils %  No Result Required.


 


Lymphocytes %  No Result Required.














- Medications


Given in the ED: 


ED Medications














Discontinued Medications














Generic Name Dose Route Start Last Admin





  Trade Name Freq  PRN Reason Stop Dose Admin


 


Acetaminophen  1,000 mg  09/26/18 15:43  09/26/18 15:49





  Tylenol -  PO  09/26/18 15:44  1,000 mg





  ONCE ONE   Administration





     





     





     





     


 


Acetaminophen  1,000 mg  09/26/18 18:46  09/26/18 18:58





  Ofirmev Injection -  IVPB  09/26/18 18:47  Not Given





  ONCE ONE   





     





     





     





     


 


Sodium Chloride  1,000 mls @ 1,000 mls/hr  09/26/18 16:01  09/26/18 16:20





  Normal Saline -  IV  09/26/18 17:00  1,000 mls/hr





  ASDIR STA   Administration





     





     





     





     


 


Levofloxacin  500 mg in 100 mls @ 100 mls/hr  09/26/18 18:47  09/26/18 20:08





  Levaquin 500 Mg Premixed Ivpb -  IVPB  09/26/18 19:46  100 mls/hr





  ONCE ONE   Administration





     





     





  Protocol   





     


 


Potassium Chloride  10 meq in 100 mls @ 100 mls/hr  09/26/18 19:30  09/26/18 18:

10





  Potassium Chloride 10 Meq Premix Ivpb -  IVPB  09/26/18 20:29  100 mls/hr





  Q60M ERLIN   Administration





     





     





     





     


 


Ondansetron HCl  4 mg  09/26/18 16:01  09/26/18 16:20





  Zofran Injection  IVPB  09/26/18 16:02  4 mg





  ONCE ONE   Administration





     





     





     





     


 


Prochlorperazine Edisylate  10 mg  09/26/18 18:56  09/26/18 19:10





  Compazine Injection -  IVPB  09/26/18 18:57  10 mg





  ONCE ONE   Administration





     





     





     





     


 


Sodium Chloride  1,000 ml  09/26/18 19:27  09/26/18 18:10





  Normal Saline -  IV  09/26/18 19:28  1,000 ml





  NOW ONE   Administration





     





     





     





     














Medical Decision Making





- Medical Decision Making





09/26/18 21:32


Care of this patient received from Dr. Garcia.


As noted above, the patient presents with fever/malaise/watery diarrhea.  White 

blood cell count 14,600 with predominance of neutrophils.


 Abdominal/pelvic CT performed to rule out acute diverticulitis/abscess: Study 

interpreted by  of radiology staff.  Thickening of the ascending/

transverse colon compatible with colitis.  No evidence of acute diverticulitis 

or abscess.


Influenza swab negative.








Case discussed with patient's gastroenterologist, .  No clear source 

of patient's fever/symptoms.  Patient should be admitted for hydration, IV 

antibiotics and further evaluation including echocardiogram and other workup of 

occult infectious sources.





Results discussed with the patient.  She continues to feel significant malaise 

and weakness.  She has agreed to admission.  Patient's PMD is .


[facility] hospitalist service will be contacted


Case discussed with JORDEN Fernandez.  [facility]








*DC/Admit/Observation/Transfer


Diagnosis at time of Disposition: 


 Colitis








- Discharge Dispostion


Condition at time of disposition: Stable


Decision to Admit order: Yes





- Referrals





- Patient Instructions





- Post Discharge Activity

## 2018-09-27 LAB
ANION GAP SERPL CALC-SCNC: 8 MMOL/L (ref 8–16)
BASOPHILS # BLD: 0 % (ref 0–2)
BUN SERPL-MCNC: 10 MG/DL (ref 7–18)
CALCIUM SERPL-MCNC: 8.3 MG/DL (ref 8.4–10.2)
CHLORIDE SERPL-SCNC: 106 MMOL/L (ref 98–107)
CO2 SERPL-SCNC: 24 MMOL/L (ref 22–28)
CREAT SERPL-MCNC: 0.8 MG/DL (ref 0.6–1.3)
DEPRECATED RDW RBC AUTO: 11.8 % (ref 11.6–15.6)
EOSINOPHIL # BLD: 0 % (ref 0–4.5)
GLUCOSE SERPL-MCNC: 127 MG/DL (ref 74–106)
HCT VFR BLD CALC: 36.7 % (ref 32.4–45.2)
HGB BLD-MCNC: 12.4 GM/DL (ref 10.7–15.3)
LYMPHOCYTES # BLD: 5.7 % (ref 8–40)
MAGNESIUM SERPL-MCNC: 1.5 MG/DL (ref 1.8–2.4)
MCH RBC QN AUTO: 30.7 PG (ref 25.7–33.7)
MCHC RBC AUTO-ENTMCNC: 33.9 G/DL (ref 32–36)
MCV RBC: 90.5 FL (ref 80–96)
MONOCYTES # BLD AUTO: 17.5 % (ref 3.8–10.2)
NEUTROPHILS # BLD: 76.8 % (ref 42.8–82.8)
PHOSPHATE SERPL-MCNC: 3 MG/DL (ref 2.5–4.6)
PLATELET # BLD AUTO: 220 K/MM3 (ref 134–434)
PMV BLD: 7.4 FL (ref 7.5–11.1)
POTASSIUM SERPLBLD-SCNC: 3.3 MMOL/L (ref 3.5–5.1)
RBC # BLD AUTO: 4.05 M/MM3 (ref 3.6–5.2)
SODIUM SERPL-SCNC: 138 MMOL/L (ref 136–145)
WBC # BLD AUTO: 10.9 K/MM3 (ref 4–10.8)

## 2018-09-27 RX ADMIN — ACETAMINOPHEN PRN MG: 10 INJECTION, SOLUTION INTRAVENOUS at 17:35

## 2018-09-27 RX ADMIN — POTASSIUM CHLORIDE AND SODIUM CHLORIDE SCH MLS/HR: 900; 150 INJECTION, SOLUTION INTRAVENOUS at 09:32

## 2018-09-27 RX ADMIN — RAMIPRIL SCH MG: 5 CAPSULE ORAL at 21:05

## 2018-09-27 RX ADMIN — PIPERACILLIN AND TAZOBACTAM SCH MLS/HR: 4; .5 INJECTION, POWDER, LYOPHILIZED, FOR SOLUTION INTRAVENOUS at 17:26

## 2018-09-27 NOTE — PN
Progress Note (short form)





- Note


Progress Note: 





ID Consult dictated


Worsening colitis now involving ileocecal area, ascending


and transverse colon after antibiotic treatment (9/9-9/22)


for suspected infectious colitis. Stool c/s previously negative.


? C difficile colitis


? Inflammatory bowel disease


Repeat stool studies 


Empiric zosyn/ flagyl


GI follow up  ? tx for inflammatory bowel

## 2018-09-27 NOTE — CONS
DATE OF CONSULTATION:

 

DATE OF DICTATION:  09/26/2018

 

GASTROENTEROLOGY CONSULTATION 

 

HISTORY OF PRESENT ILLNESS:  Mrs. Jones is a 58-year-old female who is known 
to me

from the outpatient setting as well as her previous admission for colitis.  She 
has a

history of hypertension and diverticulosis, irritable bowel syndrome.  She 
presents

back to the hospital with a 12-hour history of fever up to 101, sweats, chills. 
She has had some soft BM's today without any blood or mucus.  She also reports 
occasional lower abdominal crampy pain which is

consistent with her symptoms of IBS.  She denied nausea, vomiting, hematochezia 
and

melena, dysuria, cough, rash.  

 

PAST MEDICAL AND SURGICAL HISTORY:  As listed in the HPI.  Outpatient 
medications

were reviewed and include Nexium and ramipril.

 

SOCIAL HISTORY:  Does not smoke.  Drinks occasionally, and no substance abuse.  
Lives

with her family.  

 

REVIEW OF SYSTEMS:  Negative except for pertinent positives in HPI.

 

PHYSICAL EXAMINATION:

VITAL SIGNS:  Temperature 100.8, pulse 112, blood pressure 120/87, respiratory 
rate

18, oxygen saturation 97% on room air.  

GENERAL:  In no acute distress.  

HEENT:  Anicteric sclerae.  

CARDIOVASCULAR:  S1, S2, regular rate and rhythm.  

LUNGS:  Bilaterally clear to auscultation.  

ABDOMEN:  Tender in the left lower quadrant without any rebound or guarding.  
Normal

bowel sounds are present.    

EXTREMITIES:  No edema.

 

LABORATORY:  White blood cell count 14.6, hemoglobin and hematocrit 12 and 37, 
MCV

89.  Platelet count 255.  Sodium 139, potassium 3, BUN and creatinine 14/0.8, 
glucose

113, total bilirubin 1.1, AST 21, ALT 24, alkaline phosphatase 15.  Urine 3+ 
blood

otherwise negative.  

CT scan is pending results at this time.  

 

IMPRESSION:  Fever and diarrhea.  Included in the differential diagnosis is

Clostridium difficile colitis, bacteremia other infectious etiology can not be 
excluded at this time. 

 

RECOMMENDATION:  Follow up blood cultures and  urine culture.  Follow up results

of the CT scan.  Clear liquid  diet.  Start her empirically on antibiotics.  
Levaquin and

Flagyl for now pending results of her imaging studies.   If her temperature 
were to increase, would recommend switching antibiotics to broader coverage. 
Stool culture and c.diff pcr. Influenza swab for completeness.  Further 
recommendations pending the results of the CT scan and

blood cultures.  

 

 

DO RADHA [id number]

DD: 09/26/2018 22:21

DT: 09/27/2018 00:23

Job #:  [id number]

MTDD

## 2018-09-27 NOTE — CONS
INFECTIOUS DISEASE CONSULTATION

 

DATE OF CONSULTATION:

 

DATE OF DICTATION:  2018

 

The patient is a 58-year-old female with a history of irritable bowel syndrome,

recent hospitalization for colitis, now readmitted with abdominal pain, profuse

watery diarrhea, fever, chills, sweats.

 

The patient was hospitalized at [facility] from  through

September 15 with similar symptoms.  At that time, a CAT scan showed colitis

involving the descending colon.  She was empirically treated with IV antibiotics. 

Stool culture and C. difficile were negative as were blood cultures.  She clinically

improved on antibiotics and was discharged home on oral Levaquin and Flagyl.  She

completed the course of antibiotic therapy on 2018.  She reports being

well until the early morning of admission.  She awoke in the middle of the night with

onset of fever, sweats, chills, left lower quadrant abdominal pain, and diffuse

watery diarrhea.  She reports having 5-10 episodes of non-bloody diarrhea.

 

She presented to the emergency room where a CAT scan was performed.  It now shows

moderately severe inflammatory changes involving the terminal ileum, cecal area,

ascending, and transverse colon.

 

She was empirically treated with Zosyn and Flagyl.  Cultures are pending.  Her course

has been complicated by fever and elevated white blood cell count.  At the present

time, she is comfortable.  She has no complaints of abdominal pain.  However, she

continues to have loose, watery, non-bloody diarrhea.

 

Previous stool cultures and C. difficile were negative.

 

PAST MEDICAL HISTORY:  Positive for diverticulosis, hypertension, and irritable bowel

syndrome.

 

PAST SURGICAL HISTORY:  Status post  section.

 

ALLERGIES:  LINZESS.

 

MEDICATIONS:  Include Nexium and Ramipril.

 

SOCIAL HISTORY:  Negative for tobacco, occasional EtOH.  No history of illicit drug

use.

 

SYSTEMS REVIEW:

Neurologic:  No loss of consciousness, seizure activity, focal weakness.

Cardiac:  Negative chest pain or palpitations.

Respiratory:  Negative cough or sputum production.

Gastrointestinal:  As per HPI.

Genitourinary:  Negative for urinary tract infection.

 

LABORATORY DATA:  White count on admission 14.6 with left shift, hematocrit 36.7,

platelet count 220.  Creatinine 0.8.  Liver enzymes normal.  Blood cultures pending,

as are stool studies.  Flu swab negative.

 

PHYSICAL EXAMINATION:

General:  She is in no acute distress.  She appears weak.

Vital Signs:  Temperature of 101.4, blood pressure 125/65; pulse 110, regular;

respirations 18 per minute.

HEENT:  Sclerae are anicteric.

Heart:  Sounds S1, S2.

Lungs:  Clear.

Abdomen:  Positive bowel sounds.  There is tenderness present in both lower

quadrants.  No mass, rebound, or rigidity.

Extremities:  Negative edema.  Negative Homans sign.

 

IMPRESSION:  Worsening colitis, now involving the terminal ileum, ileocecal area,

ascending and transverse colon after antibiotic treatment for suspected infectious

colitis.  Previous stool cultures negative.

 

1.  Possible Clostridium difficile colitis.

2.  Possible inflammatory bowel disease.

 

We will repeat stool cultures, ova and parasite, C. difficile.  Empiric antibiotic

coverage with Zosyn and Flagyl.  GI followup for treatment of possible inflammatory

bowel.

 

Will follow.  Thank you for the kind referral.

 

 

GABRIELA DEJESUS M.D.

 

[id number]

DD: 2018 10:48

DT: 2018 11:18

Job #:  [id number]

## 2018-09-27 NOTE — PN
Progress Note, Physician


History of Present Illness: 





Still with diarrhea - no blood or mucus 


fever overnight , none today 


no n/v 





- Current Medication List


Current Medications: 


Active Medications





Acetaminophen (Ofirmev Injection -)  1,000 mg IVPB Q6H PRN


   PRN Reason: FEVER


   Last Admin: 09/27/18 17:35 Dose:  1,000 mg


Potassium Chloride/Sodium Chloride (Ns+20 Meq Kcl -)  20 meq in 1,000 mls @ 83 

mls/hr IV ASDIR ERLIN


   Last Admin: 09/27/18 09:32 Dose:  83 mls/hr


Metronidazole (Flagyl 500mg Premixed Ivpb -)  500 mg in 100 mls @ 100 mls/hr 

IVPB Q8H-IV ERLIN


   Last Admin: 09/27/18 17:26 Dose:  100 mls/hr


Piperacillin Sod/Tazobactam Sod (Zosyn 4.5gm Ivpb (Pre-Docked))  4.5 gm in 100 

mls @ 200 mls/hr IVPB Q8H-IV ERLIN; Protocol


   Last Admin: 09/27/18 17:26 Dose:  200 mls/hr


Pantoprazole Sodium (Protonix Iv)  40 mg IVPUSH DAILY ERLIN


   Last Admin: 09/27/18 09:31 Dose:  40 mg


Ramipril (Altace -)  5 mg PO DAILY ERLIN


Ramipril (Altace -)  10 mg PO HS ERLIN


   Last Admin: 09/27/18 21:05 Dose:  10 mg











- Objective


Vital Signs: 


 Vital Signs











Temperature  98.7 F   09/27/18 20:36


 


Pulse Rate  85   09/27/18 20:36


 


Respiratory Rate  18   09/27/18 20:36


 


Blood Pressure  121/76   09/27/18 20:36


 


O2 Sat by Pulse Oximetry (%)  98   09/27/18 20:36











Constitutional: Yes: No Distress, Calm


Eyes: Yes: WNL


HENT: Yes: WNL


Neck: Yes: WNL


Cardiovascular: Yes: WNL


Respiratory: Yes: WNL


Gastrointestinal: Yes: Other (left sided tenderness no rebound or guarding)


Extremities: Yes: WNL


Edema: No


Labs: 


 CBC, BMP





 09/27/18 07:30 





 09/27/18 07:30 











Problem List





- Problems


(1) Colitis


Code(s): K52.9 - NONINFECTIVE GASTROENTERITIS AND COLITIS, UNSPECIFIED   





Assessment/Plan


Impression: diarrhea / left sided abdominal pain DDX : infectious / drug 

induced colitis/ IBD - however ESR normal. 





REC:


- f/u stool studies / c.diff / cyclospora/ isospora/ cryptosporidium/ giardia/ 

cmv


- for now c/w abx 


- plan for flex sig tomorrow - npo midnight 


- further recommendations pending results from tomorrows exam of the distal 

colon.

## 2018-09-27 NOTE — PN
Physical Exam: 


SUBJECTIVE: Patient seen and examined, She reports intermittent abdominal 

cramping and tactile fever, one episode of loose stools noted.  





OBJECTIVE:  Patient is a 58-year-old female with a past medical history 

diverticulosis and hypertension. Patient was recently admitted to the hospital 

for colitis 09/09/2018-09/15/2018. Patient was admitted for the emergency 

department for colitis.





 Vital Signs











 Period  Temp  Pulse  Resp  BP Sys/Hernandez  Pulse Ox


 


 Last 24 Hr  98.7 F-101.4 F  104-115  15-18  117-139/62-87  96-98











GENERAL: The patient is awake, alert, and fully oriented, in no acute distress.


HEAD: Normal with no signs of trauma.


EYES: PERRL, extraocular movements intact, sclera anicteric, conjunctiva clear. 

No ptosis. 


ENT: Ears normal, nares patent, oropharynx clear without exudates, moist mucous 


membranes.


NECK: Trachea midline, full range of motion, supple. 


LUNGS: Breath sounds equal, clear to auscultation bilaterally, no wheezes, no 

crackles, no 


accessory muscle use. 


HEART: Regular rate and rhythm, S1, S2 without murmur, rub or gallop.


ABDOMEN: Soft, Mild tenderness upon deep palpation to the left lower quadrant,  

nondistended, normoactive bowel sounds, no guarding, no 


rebound, no hepatosplenomegaly, no masses.


EXTREMITIES: 2+ pulses, warm, well-perfused, no edema. 


NEUROLOGICAL: Cranial nerves II through XII grossly intact. Normal speech, gait 

not 


observed.


PSYCH: Normal mood, normal affect.


SKIN: Warm, dry, normal turgor, no rashes or lesions noted














 Laboratory Results - last 24 hr








 CBC











WBC  10.9 K/mm3 (4.0-10.8)  H  09/27/18  07:30    


 


RBC  4.05 M/mm3 (3.60-5.2)   09/27/18  07:30    


 


Hgb  12.4 GM/dl (10.7-15.3)   09/27/18  07:30    


 


Hct  36.7 % (32.4-45.2)   09/27/18  07:30    


 


MCV  90.5 fl (80-96)   09/27/18  07:30    


 


MCH  30.7 pg (25.7-33.7)   09/27/18  07:30    


 


MCHC  33.9 g/dl (32.0-36.0)   09/27/18  07:30    


 


RDW  11.8 % (11.6-15.6)   09/27/18  07:30    


 


Plt Count  220 K/MM3 (134-434)   09/27/18  07:30    


 


MPV  7.4 fl (7.5-11.1)  L  09/27/18  07:30    


 


Absolute Neuts (auto)  8.4 K/mm3  09/27/18  07:30    


 


Neutrophils %  76.8 % (42.8-82.8)   09/27/18  07:30    


 


Neutrophils % (Manual)  95.0 % (42.8-82.8)  H*  09/26/18  16:11    


 


Band Neutrophils %  1.0 % (0-10)   09/26/18  16:11    


 


Lymphocytes %  5.7 % (8-40)  L  09/27/18  07:30    


 


Lymphocytes % (Manual)  2.0 % (8-40)  L  09/26/18  16:11    


 


Monocytes %  17.5 % (3.8-10.2)  H  09/27/18  07:30    


 


Monocytes % (Manual)  2 % (3.8-10.2)  L  09/26/18  16:11    


 


Eosinophils %  0.0 % (0-4.5)   09/27/18  07:30    


 


Basophils %  0.0 % (0-2.0)   09/27/18  07:30    


 


Platelet Estimate  Adequate   09/26/18  16:11    


 


ESR  14 mm/hr (0-30)   09/26/18  16:11    








 CMP











Sodium  138 mmol/L (136-145)   09/27/18  07:30    


 


Potassium  3.3 mmol/L (3.5-5.1)  L  09/27/18  07:30    


 


Chloride  106 mmol/L ()   09/27/18  07:30    


 


Carbon Dioxide  24 mmol/L (22-28)   09/27/18  07:30    


 


Anion Gap  8 MMOL/L (8-16)   09/27/18  07:30    


 


BUN  10 mg/dl (7-18)   09/27/18  07:30    


 


Creatinine  0.8 mg/dl (0.6-1.3)   09/27/18  07:30    


 


Creat Clearance w eGFR  > 60  (>60)   09/27/18  07:30    


 


Random Glucose  127 mg/dl ()  H  09/27/18  07:30    


 


Lactic Acid  1.9 mmol/L (0.4-2.0)   09/26/18  16:11    


 


Calcium  8.3 mg/dl (8.4-10.2)  L  09/27/18  07:30    


 


Phosphorus  3.0 mg/dl (2.5-4.6)   09/27/18  07:30    


 


Magnesium  1.5 mg/dL (1.8-2.4)  L  09/27/18  07:30    


 


Total Bilirubin  1.1 mg/dl (0.2-1.0)  H  09/26/18  16:11    


 


AST  21 U/L (10-42)  D 09/26/18  16:11    


 


ALT  24 U/L (10-40)  D 09/26/18  16:11    


 


Alkaline Phosphatase  50 U/L (32-92)  D 09/26/18  16:11    


 


Total Protein  6.9 g/dl (6.4-8.3)   09/26/18  16:11    


 


Albumin  4.1 g/dl (3.5-5.0)   09/26/18  16:11    














Active Medications











Generic Name Dose Route Start Last Admin





  Trade Name Freq  PRN Reason Stop Dose Admin


 


Potassium Chloride/Sodium Chloride  20 meq in 1,000 mls @ 83 mls/hr  09/27/18 08

:30  09/27/18 09:32





  Ns+20 Meq Kcl -  IV   83 mls/hr





  ASDIR ERLIN   Administration





     





     





     





     


 


Metronidazole  500 mg in 100 mls @ 100 mls/hr  09/27/18 10:00  09/27/18 09:31





  Flagyl 500mg Premixed Ivpb -  IVPB   100 mls/hr





  Q8H-IV ERLIN   Administration





     





     





     





     


 


Piperacillin Sod/Tazobactam Sod  4.5 gm in 100 mls @ 200 mls/hr  09/27/18 18:00

  





  Zosyn 4.5gm Ivpb (Pre-Docked)  IVPB   





  Q8H-IV ERLIN   





     





     





  Protocol   





     


 


Pantoprazole Sodium  40 mg  09/27/18 10:00  09/27/18 09:31





  Protonix Iv  IVPUSH   40 mg





  DAILY ERLIN   Administration





     





     





     





     











 Microbiology





09/26/18 18:53   Nasopharyngeal Swab   Influenza Types A,B Antigen - Final, 

negative 


09/26/18 18:53   Nasopharyngeal Swab    - Final, negative 





Radiology Reports


CT abd/pelvis with IV contrast


Impression: Thickening of the ascending and most of the transverse colon wall 

compatible colitis. There is mild mesenteric stranding around the cecum. There 

is also suggestion of mild thickening of the terminal ileum wall., Infectious 

versus inflammatory process. Diverticulosis coli mainly in the distal 

descending and sigmoid colon without evidence of acute diverticulitis. 


Reported By: Duyen Saenz MD 09/26/18 2045 





ASSESSMENT/PLAN:





1) GI


colitis


- likely infectious versus inflammatory process,Will upgrade antibiotics to 

Zosyn and Flagyl, appreciated input other infectious disease Dr. Leigh


- Strong suspicions of inflammatory process, Patient may benefit from IV 

steroids,Case discussed with patient's private Gastroenterologist, Dr. Gomez, 

will evaluate patient today, requesting surgery to be consulted


- Leukocytosis improving MAXIMUM TEMPERATURE 101.2 stool cultures ordered 

pending C. difficile. Will follow-up blood culture and urine culture





2) cardiovascular 


hypertension


- blood pressure at goal, will continue home dose of ramipril


- strict b/p monitoring q4h 





f/e/n


hypokalemia


-add 20meq kci to ivf, will give kci 40meq po, repeat bmp in am


- clear liquid diet








DVT PPX


   - heparin deferred for now, start if LOS exceed 48h








Dispo: Pt currently requires further observation.








Visit type





- Emergency Visit


Emergency Visit: Yes


ED Registration Date: 09/28/18


Care time: The patient presented to the Emergency Department on the above date 

and was hospitalized for further evaluation of their emergent condition.





- New Patient


This patient is new to me today: No





- Critical Care


Critical Care patient: No





- Discharge Referral


Referred to [facility] No

## 2018-09-27 NOTE — HP
CHIEF COMPLAINT: fever, chills, diarrhea





PCP: Sourav; GI: Patricia





HISTORY OF PRESENT ILLNESS:


This is a 58 year old female with a significant past medical history of 

diverticulosis and HTN and recent hospitalization for colitis from 18-9/15/

18 who presented to the ED with fever, chills and increased frequency of her 

loose BM. She reports that she has had loose bm since her DC from the hospital 

but became more frequent today, 8-9x. She reports continued LLQ pain. She 

completed her antibiotics on . She denies hematochezia.





ER course was notable for:


(1) WBC 14.6


(2) CT with colitis transverse and ascending colon








Recent Travel:


pt denies





PAST MEDICAL HISTORY:


diverticulosis


HTN


PAST SURGICAL HISTORY:


 x 2





Social History:


Smoking: pt denies


Alcohol: pt denies


Drugs: pt denies





Family History:


mother alive, h/o BrCA, OA


father  age 83, prostate CA, h/o CABG


brother alive with CAD, HLD, HTN





Allergies


linaclotide [From Linzess] Adverse Reaction (Intermediate, Verified 18 15:

29)


 diarrhea





HOME MEDICATIONS:


 3





 Medication  Instructions  Recorded


 


Esomeprazole Magnesium [Nexium 20 mg PO DAILY 18





24Hr]  


 


Ramipril 10 mg PO HS 09/10/18








REVIEW OF SYSTEMS


CONSTITUTIONAL: Present: fever, chills


Absent:  diaphoresis, generalized weakness, malaise, loss of appetite, weight 

change


HEENT: 


Absent:  rhinorrhea, nasal congestion, throat pain, throat swelling, difficulty 

swallowing, mouth swelling, ear pain, eye pain, visual changes


CARDIOVASCULAR: 


Absent: chest pain, syncope, palpitations, irregular heart rate, lightheadedness

, peripheral edema


RESPIRATORY: 


Absent: cough, shortness of breath, dyspnea with exertion, orthopnea, wheezing, 

stridor, hemoptysis


GASTROINTESTINAL: Present: abdominal pain


Absent: abdominal distension, nausea, vomiting, diarrhea, constipation, melena, 

hematochezia


GENITOURINARY: 


Absent: dysuria, frequency, urgency, hesitancy, hematuria, flank pain, genital 

pain


MUSCULOSKELETAL: 


Absent: myalgia, arthralgia, joint swelling, back pain, neck pain


SKIN: 


Absent: rash, itching, pallor


HEMATOLOGIC/IMMUNOLOGIC: 


Absent: easy bleeding, easy bruising, lymphadenopathy, frequent infections


ENDOCRINE:


Absent: unexplained weight gain, unexplained weight loss, heat intolerance, 

cold intolerance


NEUROLOGIC: 


Absent: headache, focal weakness or paresthesias, dizziness, unsteady gait, 

seizure, mental status changes, bladder or bowel incontinence


PSYCHIATRIC: 


Absent: anxiety, depression, suicidal or homicidal ideation, hallucinations.








PHYSICAL EXAMINATION


Vital Signs - 24 hr





 3





  18





  15:28 17:54 18:35


 


Temperature 100.0 F H 99.0 F 98.7 F


 


Pulse Rate 107 H  


 


Pulse Rate [  104 H 112 H





Left]   


 


Respiratory 18 17 18





Rate   


 


Blood Pressure 133/82  


 


Blood Pressure  117/62 123/87





[Right Arm]   


 


O2 Sat by Pulse 98 97 97





Oximetry (%)   








 3





  18





  18:42 23:35 23:45


 


Temperature 100.8 F H 98.8 F 99.9 F H


 


Pulse Rate  112 H 


 


Pulse Rate [   115 H





Left]   


 


Respiratory  18 15





Rate   


 


Blood Pressure  139/71 


 


Blood Pressure   135/86





[Right Arm]   


 


O2 Sat by Pulse  98 96





Oximetry (%)   








GENERAL: Awake, alert, and fully oriented, in no acute distress.


HEAD: Normal with no signs of trauma.


EYES: Pupils equal, round and reactive to light, extraocular movements intact, 

sclera anicteric, conjunctiva clear. No lid lag.


EARS, NOSE, THROAT: Ears normal, nares patent, oropharynx clear without 

exudates. Moist mucous membranes.


NECK: Normal range of motion, supple without lymphadenopathy, JVD, or masses.


LUNGS: Breath sounds equal, clear to auscultation bilaterally. No wheezes, and 

no crackles. No accessory muscle use.


HEART: Regular rate and rhythm, normal S1 and S2 without murmur, rub or gallop.


ABDOMEN: Soft, tender B/L LQ, not distended, normoactive bowel sounds, no 

guarding, no rebound, no masses.  No hepatomegaly or  splenomegaly. 


MUSCULOSKELETAL: Normal range of motion at all joints. No bony deformities or 

tenderness. No CVA tenderness.


UPPER EXTREMITIES: 2+ pulses, warm, well-perfused. No cyanosis. No clubbing. No 

peripheral edema.


LOWER EXTREMITIES: 2+ pulses, warm, well-perfused. No calf tenderness. No 

peripheral edema. 


NEUROLOGICAL:  Cranial nerves II-XII intact. Normal speech. Normal gait.


PSYCHIATRIC: Cooperative. Good eye contact. Appropriate mood and affect.


SKIN: Warm, dry, normal turgor, no rashes or lesions noted, normal capillary 

refill. 





Laboratory Results - last 24 hr





 3





  18





  16:11 16:11 16:11


 


WBC  14.6 H  


 


RBC  4.19  


 


Hgb  12.8  


 


Hct  37.5  


 


MCV  89.5  


 


MCH  30.5  


 


MCHC  34.0  


 


RDW  11.7  


 


Plt Count  255  


 


MPV  7.4 L  


 


Absolute Neuts (auto)  13.2  


 


Neutrophils %  No Result Required.  


 


Neutrophils % (Manual)  95.0 H*  


 


Band Neutrophils %  1.0  


 


Lymphocytes %  No Result Required.  


 


Lymphocytes % (Manual)  2.0 L  


 


Monocytes % (Manual)  2 L  


 


Platelet Estimate  Adequate  


 


ESR    14


 


Sodium   139 


 


Potassium   3.0 L D 


 


Chloride   105 


 


Carbon Dioxide   25 


 


Anion Gap   9 


 


BUN   14 


 


Creatinine   0.8 


 


Creat Clearance w eGFR   > 60 


 


Random Glucose   113 H 


 


Lactic Acid    1.9


 


Calcium   8.9 


 


Total Bilirubin   1.1 H 


 


AST   21  D 


 


ALT   24  D 


 


Alkaline Phosphatase   50  D 


 


Total Protein   6.9 


 


Albumin   4.1 


 


Urine Color   


 


Urine Appearance   


 


Urine pH   


 


Ur Specific Gravity   


 


Urine Protein   


 


Urine Glucose (UA)   


 


Urine Ketones   


 


Urine Blood   


 


Urine Nitrite   


 


Urine Bilirubin   


 


Urine Urobilinogen   


 


Ur Leukocyte Esterase   


 


Urine RBC   


 


Urine WBC   


 


Urine Bacteria   








 3





Urine Color  Yellow   18  17:28    


 


Urine Appearance  Clear   18  17:28    


 


Urine pH  5.5  (4.5-8)   18  17:28    


 


Ur Specific Gravity  1.020  (1.005-1.025)   18  17:28    


 


Urine Protein  Trace  (NEGATIVE)   18  17:28    


 


Urine Glucose (UA)  Negative  (NEGATIVE)   18  17:28    


 


Urine Ketones  Negative  (NEGATIVE)   18  17:28    


 


Urine Blood  3+  (NEGATIVE)  H  18  17:28    


 


Urine Nitrite  Negative  (NEGATIVE)   18  17:28    


 


Urine Bilirubin  Negative  (NEGATIVE)   18  17:28    


 


Ur Leukocyte Esterase  Negative  (NEGATIVE)   18  17:28    


 


Urine RBC  10-20 /hpf (0-3)   18  17:28    


 


Urine WBC  0-2  (0-5)   18  17:28    


 


Urine Bacteria  Few /hpf (NEGATIVE)   18  17:28    








Radiology Reports


CT abd/pelvis with IV contrast


Impression: Thickening of the ascending and most of the transverse colon wall 

compatible colitis. There is mild mesenteric stranding around the cecum. There 

is also suggestion of mild thickening of the terminal ileum wall., Infectious 

versus inflammatory process. Diverticulosis coli mainly in the distal 

descending and sigmoid colon without evidence of acute diverticulitis. 


Reported By: Duyen Saenz MD 18 








ASSESSMENT/PLAN:


58yF with PMH HTN, diverticulitis s/p recent hospitalization for colitis 

presented to the ED for fever, chills, loose BM.





colitis


   - GI consult appreciated


   - cont levaquin/flagyl   


   - ID consult


   - clear liquid diet as tolerated





hypokalemia


   - potassium IV burning, pt refusing, requesting po


   - potassium 40mEq po x 1 now then repeat in am


   - BMP in am





mild hematuria


   - CT without evidence of stone


   - no flank pain


   - consider urology consult





HTN


   - cont home meds





DVT PPX


   - heparin deferred for now, start if LOS exceed 48h





FEN


   - pt declining IVF, trial po


   - BMP in am


   - clear liquid diet as tolerated





Dispo: Pt currently requires further observation.








Visit type





- Emergency Visit


Emergency Visit: Yes


ED Registration Date: 18


Care time: The patient presented to the Emergency Department on the above date 

and was hospitalized for further evaluation of their emergent condition.





- New Patient


This patient is new to me today: Yes


Date on this admission: 18





- Critical Care


Critical Care patient: No





Hospitalist Screening





- Colonoscopy Questionnaire


Colonoscopy Questionnaire: 





Colonoscopy Questionnaire

## 2018-09-28 LAB
ANION GAP SERPL CALC-SCNC: 5 MMOL/L (ref 8–16)
BASOPHILS # BLD: 0.3 % (ref 0–2)
BUN SERPL-MCNC: 7 MG/DL (ref 7–18)
CALCIUM SERPL-MCNC: 8.3 MG/DL (ref 8.4–10.2)
CHLORIDE SERPL-SCNC: 107 MMOL/L (ref 98–107)
CO2 SERPL-SCNC: 25 MMOL/L (ref 22–28)
CREAT SERPL-MCNC: 0.8 MG/DL (ref 0.6–1.3)
DEPRECATED RDW RBC AUTO: 11.8 % (ref 11.6–15.6)
EOSINOPHIL # BLD: 1 % (ref 0–4.5)
GLUCOSE SERPL-MCNC: 108 MG/DL (ref 74–106)
HCT VFR BLD CALC: 32.6 % (ref 32.4–45.2)
HGB BLD-MCNC: 10.6 GM/DL (ref 10.7–15.3)
LYMPHOCYTES # BLD: 20.7 % (ref 8–40)
MAGNESIUM SERPL-MCNC: 2 MG/DL (ref 1.8–2.4)
MCH RBC QN AUTO: 29.2 PG (ref 25.7–33.7)
MCHC RBC AUTO-ENTMCNC: 32.4 G/DL (ref 32–36)
MCV RBC: 90.3 FL (ref 80–96)
MONOCYTES # BLD AUTO: 7.1 % (ref 3.8–10.2)
NEUTROPHILS # BLD: 70.9 % (ref 42.8–82.8)
PHOSPHATE SERPL-MCNC: 2 MG/DL (ref 2.5–4.6)
PLATELET # BLD AUTO: 210 K/MM3 (ref 134–434)
PMV BLD: 6.6 FL (ref 7.5–11.1)
POTASSIUM SERPLBLD-SCNC: 3.4 MMOL/L (ref 3.5–5.1)
RBC # BLD AUTO: 3.61 M/MM3 (ref 3.6–5.2)
SODIUM SERPL-SCNC: 137 MMOL/L (ref 136–145)
WBC # BLD AUTO: 6.2 K/MM3 (ref 4–10.8)

## 2018-09-28 PROCEDURE — 0DDF8ZX EXTRACTION OF RIGHT LARGE INTESTINE, VIA NATURAL OR ARTIFICIAL OPENING ENDOSCOPIC, DIAGNOSTIC: ICD-10-PCS | Performed by: INTERNAL MEDICINE

## 2018-09-28 PROCEDURE — 0DDE8ZX EXTRACTION OF LARGE INTESTINE, VIA NATURAL OR ARTIFICIAL OPENING ENDOSCOPIC, DIAGNOSTIC: ICD-10-PCS | Performed by: INTERNAL MEDICINE

## 2018-09-28 RX ADMIN — FAMOTIDINE SCH MLS/HR: 20 INJECTION, SOLUTION INTRAVENOUS at 10:21

## 2018-09-28 RX ADMIN — RAMIPRIL SCH MG: 5 CAPSULE ORAL at 10:21

## 2018-09-28 RX ADMIN — VANCOMYCIN HYDROCHLORIDE SCH MG: 250 CAPSULE ORAL at 23:42

## 2018-09-28 RX ADMIN — PIPERACILLIN AND TAZOBACTAM SCH MLS/HR: 4; .5 INJECTION, POWDER, LYOPHILIZED, FOR SOLUTION INTRAVENOUS at 17:27

## 2018-09-28 RX ADMIN — PIPERACILLIN AND TAZOBACTAM SCH MLS/HR: 4; .5 INJECTION, POWDER, LYOPHILIZED, FOR SOLUTION INTRAVENOUS at 10:21

## 2018-09-28 RX ADMIN — FAMOTIDINE SCH MLS/HR: 20 INJECTION, SOLUTION INTRAVENOUS at 21:42

## 2018-09-28 RX ADMIN — PIPERACILLIN AND TAZOBACTAM SCH MLS/HR: 4; .5 INJECTION, POWDER, LYOPHILIZED, FOR SOLUTION INTRAVENOUS at 01:15

## 2018-09-28 RX ADMIN — RAMIPRIL SCH MG: 5 CAPSULE ORAL at 21:41

## 2018-09-28 RX ADMIN — ACETAMINOPHEN PRN MG: 10 INJECTION, SOLUTION INTRAVENOUS at 20:42

## 2018-09-28 RX ADMIN — POTASSIUM CHLORIDE AND SODIUM CHLORIDE SCH MLS/HR: 900; 150 INJECTION, SOLUTION INTRAVENOUS at 10:26

## 2018-09-28 RX ADMIN — VANCOMYCIN HYDROCHLORIDE SCH MG: 250 CAPSULE ORAL at 17:30

## 2018-09-28 RX ADMIN — ACETAMINOPHEN PRN MG: 10 INJECTION, SOLUTION INTRAVENOUS at 07:06

## 2018-09-28 NOTE — PN
Progress Note, Physician


History of Present Illness: 





No c/o abdominal pain


Still with watery , non-bloody stool


No N/V


Tolerating antibiotics


Remains febrile


WBC improved  10.9


Cultures pending





- Current Medication List


Current Medications: 


Active Medications





Acetaminophen (Ofirmev Injection -)  1,000 mg IVPB Q6H PRN


   PRN Reason: FEVER


   Last Admin: 09/28/18 07:06 Dose:  1,000 mg


Potassium Chloride/Sodium Chloride (Ns+20 Meq Kcl -)  20 meq in 1,000 mls @ 83 

mls/hr IV ASDIR ERLIN


   Last Admin: 09/27/18 09:32 Dose:  83 mls/hr


Metronidazole (Flagyl 500mg Premixed Ivpb -)  500 mg in 100 mls @ 100 mls/hr 

IVPB Q8H-IV ERLIN


   Last Admin: 09/28/18 01:42 Dose:  100 mls/hr


Piperacillin Sod/Tazobactam Sod (Zosyn 4.5gm Ivpb (Pre-Docked))  4.5 gm in 100 

mls @ 200 mls/hr IVPB Q8H-IV ERLIN; Protocol


   Last Admin: 09/28/18 01:15 Dose:  200 mls/hr


Famotidine/Sodium Chloride (Pepcid 20 Mg Premixed Ivpb -)  20 mg in 50 mls @ 

100 mls/hr IVPB BID ERLIN


Ramipril (Altace -)  5 mg PO DAILY ERLIN


Ramipril (Altace -)  10 mg PO HS ERLIN


   Last Admin: 09/27/18 21:05 Dose:  10 mg











- Objective


Vital Signs: 


 Vital Signs











Temperature  98.4 F   09/28/18 08:00


 


Pulse Rate  92 H  09/28/18 06:00


 


Respiratory Rate  18   09/28/18 06:00


 


Blood Pressure  129/70   09/28/18 06:00


 


O2 Sat by Pulse Oximetry (%)  98   09/28/18 04:00











Constitutional: Yes: No Distress


Cardiovascular: Yes: Regular Rate and Rhythm, S1, S2


Respiratory: Yes: CTA Bilaterally


Gastrointestinal: Yes: Normal Bowel Sounds, Soft.  No: Tenderness


Edema: No





Assessment/Plan





Colitis ? etiology


 Recurrence/ worsening despite 2w course antibiotics


 Stool studies to date negative


 R/O inflammatory bowel disease


 Discussed with GI


 For endoscopy today


 Continue empiric antibiotics pending stool studies

## 2018-09-28 NOTE — PN
Progress Note (short form)





- Note


Progress Note: 





surgery





ct shows pan colitis.  recommend medical managment unless megacolon develops.  

follow per gi.

## 2018-09-28 NOTE — PN
Physical Exam: 


SUBJECTIVE: Patient seen and examined, Reports less abdominal pain denies any 

tactile fever does report frequent episodes of loose stools





OBJECTIVE:Patient is a 58-year-old female with a past medical history 

diverticulosis and hypertension. Patient was recently admitted to the hospital 

for colitis 09/09/2018-09/15/2018. Patient was admitted for the emergency 

department for colitis





 Vital Signs











 Period  Temp  Pulse  Resp  BP Sys/Hernandez  Pulse Ox


 


 Last 24 Hr  98.7 F-101.8 F    18-19  105-136/69-81  











GENERAL: The patient is awake, alert, and fully oriented, in no acute distress.


HEAD: Normal with no signs of trauma.


EYES: PERRL, extraocular movements intact, sclera anicteric, conjunctiva clear. 

No ptosis. 


ENT: Ears normal, nares patent, oropharynx clear without exudates, moist mucous 


membranes.


NECK: Trachea midline, full range of motion, supple. 


LUNGS: Breath sounds equal, clear to auscultation bilaterally, no wheezes, no 

crackles, no 


accessory muscle use. 


HEART: Regular rate and rhythm, S1, S2 without murmur, rub or gallop.


ABDOMEN: Soft, left lower quadrant tenderness upon deep palpaiton, nondistended

, normoactive bowel sounds, no guarding, no 


rebound, no hepatosplenomegaly, no masses.


EXTREMITIES: 2+ pulses, warm, well-perfused, no edema. 


NEUROLOGICAL: Cranial nerves II through XII grossly intact. Normal speech, gait 

not 


observed.


PSYCH: Normal mood, normal affect.


SKIN: Warm, dry, normal turgor, no rashes or lesions noted














Active Medications











Generic Name Dose Route Start Last Admin





  Trade Name Elroy  PRN Reason Stop Dose Admin


 


Acetaminophen  1,000 mg  09/27/18 17:28  09/28/18 07:06





  Ofirmev Injection -  IVPB   1,000 mg





  Q6H PRN   Administration





  FEVER   





     





     





     


 


Potassium Chloride/Sodium Chloride  20 meq in 1,000 mls @ 83 mls/hr  09/27/18 08

:30  09/27/18 09:32





  Ns+20 Meq Kcl -  IV   83 mls/hr





  ASDIR ERLIN   Administration





     





     





     





     


 


Metronidazole  500 mg in 100 mls @ 100 mls/hr  09/27/18 10:00  09/28/18 01:42





  Flagyl 500mg Premixed Ivpb -  IVPB   100 mls/hr





  Q8H-IV ERLIN   Administration





     





     





     





     


 


Piperacillin Sod/Tazobactam Sod  4.5 gm in 100 mls @ 200 mls/hr  09/27/18 18:00

  09/28/18 01:15





  Zosyn 4.5gm Ivpb (Pre-Docked)  IVPB   200 mls/hr





  Q8H-IV ERLIN   Administration





     





     





  Protocol   





     


 


Famotidine/Sodium Chloride  20 mg in 50 mls @ 100 mls/hr  09/28/18 10:00  





  Pepcid 20 Mg Premixed Ivpb -  IVPB   





  BID ERLIN   





     





     





     





     


 


Ramipril  5 mg  09/28/18 10:00  





  Altace -  PO   





  DAILY ERLIN   





     





     





     





     


 


Ramipril  10 mg  09/27/18 22:00  09/27/18 21:05





  Altace -  PO   10 mg





  HS ERLIN   Administration





     





     





     





     








 Microbiology











 09/27/18 08:00 Clostridium difficile Antigen (RANDY) - Final, + 





 Stool Clostridium difficile Toxin Assay - Final, -


 


 09/27/18 08:00 Cryptosporidium Antigen - Final, negative





 Stool Giardia Antigen (RANDY) - Final, negative 


 


 09/27/18 08:00 Salmonella/Shigella Culture - Preliminary





 Stool    NO ENTERIC PATHOGENS, 24 HOURS, ON PRIMARY PLATES





 Yersinia Culture - Preliminary





    NO ENTERIC PATHOGENS, 24 HOURS, ON PRIMARY PLATES





 Vibrio Culture - Final





    NO GROWTH OF VIBRIO SPECIES OBTAINED





 Escherichia coli 0157 Culture - Final





    NO GROWTH OF E COLI 0157 OBTAINED


 


 09/27/18 08:00 Isospora Smear - Preliminary





 Stool 


 


 09/26/18 17:30 Urine Culture - Final





 Urine - Urine Clean Catch    NO GROWTH OBTAINED


 


 09/26/18 16:11 Blood Culture - Preliminary





 Blood - Peripheral Venous    NO GROWTH OBTAINED AFTER 24 HOURS, INCUBATION TO 

CONTINUE





    FOR 4 DAYS.


 


 09/26/18 16:11 Blood Culture - Preliminary





 Blood - Peripheral Venous    NO GROWTH OBTAINED AFTER 24 HOURS, INCUBATION TO 

CONTINUE





    FOR 4 DAYS.








Radiology Reports


CT abd/pelvis with IV contrast


Impression: Thickening of the ascending and most of the transverse colon wall 

compatible colitis. There is mild mesenteric stranding around the cecum. There 

is also suggestion of mild thickening of the terminal ileum wall., Infectious 

versus inflammatory process. Diverticulosis coli mainly in the distal 

descending and sigmoid colon without evidence of acute diverticulitis. 


Reported By: Duyen Saenz MD 09/26/18 2045 





ASSESSMENT/PLAN:





1) GI


colitis


- likely infectious versus inflammatory process-->cdiff toxin -, antigen +, 

since Patient has multiple episodes of loose stools will treat with vancomycin 

125 mg by mouth every 6 hours  Dr. Leigh infectious disease physician, 

consulted and following


- leukocytosis resolved, low grade temp noted 


-  Dr. Gomez patient's private gastroenterologist consulted and following 

patient is pending a sigmoid flexscopy today





2) cardiovascular 


hypertension


- blood pressure at goal, will continue home dose of ramipril


- strict b/p monitoring q4h 





f/e/n


hypokalemia


-kphos gtt ordered, continue ivf [id number] @ 83ml/hr 








DVT PPX


   - lovenox 








Dispo: Pt currently requires further observation.











Visit type





- Emergency Visit


Emergency Visit: Yes


ED Registration Date: 09/28/18


Care time: The patient presented to the Emergency Department on the above date 

and was hospitalized for further evaluation of their emergent condition.





- New Patient


This patient is new to me today: No





- Critical Care


Critical Care patient: No





- Discharge Referral


Referred to [facility] No

## 2018-09-29 LAB
ANION GAP SERPL CALC-SCNC: 8 MMOL/L (ref 8–16)
BASOPHILS # BLD: 0.5 % (ref 0–2)
BUN SERPL-MCNC: 7 MG/DL (ref 7–18)
CALCIUM SERPL-MCNC: 8.7 MG/DL (ref 8.4–10.2)
CHLORIDE SERPL-SCNC: 108 MMOL/L (ref 98–107)
CO2 SERPL-SCNC: 22 MMOL/L (ref 22–28)
CREAT SERPL-MCNC: 0.7 MG/DL (ref 0.6–1.3)
DEPRECATED RDW RBC AUTO: 12 % (ref 11.6–15.6)
EOSINOPHIL # BLD: 3.2 % (ref 0–4.5)
GLUCOSE SERPL-MCNC: 97 MG/DL (ref 74–106)
HCT VFR BLD CALC: 34.5 % (ref 32.4–45.2)
HGB BLD-MCNC: 11.3 GM/DL (ref 10.7–15.3)
LYMPHOCYTES # BLD: 24.1 % (ref 8–40)
MAGNESIUM SERPL-MCNC: 1.9 MG/DL (ref 1.8–2.4)
MCH RBC QN AUTO: 30 PG (ref 25.7–33.7)
MCHC RBC AUTO-ENTMCNC: 32.9 G/DL (ref 32–36)
MCV RBC: 91.2 FL (ref 80–96)
MONOCYTES # BLD AUTO: 8 % (ref 3.8–10.2)
NEUTROPHILS # BLD: 64.2 % (ref 42.8–82.8)
PHOSPHATE SERPL-MCNC: 2.5 MG/DL (ref 2.5–4.6)
PLATELET # BLD AUTO: 230 K/MM3 (ref 134–434)
PMV BLD: 7.8 FL (ref 7.5–11.1)
POTASSIUM SERPLBLD-SCNC: 3.6 MMOL/L (ref 3.5–5.1)
RBC # BLD AUTO: 3.78 M/MM3 (ref 3.6–5.2)
SODIUM SERPL-SCNC: 138 MMOL/L (ref 136–145)
WBC # BLD AUTO: 6.9 K/MM3 (ref 4–10.8)

## 2018-09-29 RX ADMIN — VANCOMYCIN HYDROCHLORIDE SCH MG: 250 CAPSULE ORAL at 06:35

## 2018-09-29 RX ADMIN — VANCOMYCIN HYDROCHLORIDE SCH MG: 250 CAPSULE ORAL at 18:27

## 2018-09-29 RX ADMIN — FAMOTIDINE SCH MLS/HR: 20 INJECTION, SOLUTION INTRAVENOUS at 10:50

## 2018-09-29 RX ADMIN — Medication SCH TAB: at 14:15

## 2018-09-29 RX ADMIN — PIPERACILLIN AND TAZOBACTAM SCH MLS/HR: 4; .5 INJECTION, POWDER, LYOPHILIZED, FOR SOLUTION INTRAVENOUS at 02:09

## 2018-09-29 RX ADMIN — FAMOTIDINE SCH MLS/HR: 20 INJECTION, SOLUTION INTRAVENOUS at 21:25

## 2018-09-29 RX ADMIN — VANCOMYCIN HYDROCHLORIDE SCH MG: 250 CAPSULE ORAL at 12:17

## 2018-09-29 RX ADMIN — RAMIPRIL SCH MG: 5 CAPSULE ORAL at 21:25

## 2018-09-29 RX ADMIN — VANCOMYCIN HYDROCHLORIDE SCH MG: 250 CAPSULE ORAL at 23:31

## 2018-09-29 RX ADMIN — POTASSIUM CHLORIDE AND SODIUM CHLORIDE SCH MLS/HR: 900; 150 INJECTION, SOLUTION INTRAVENOUS at 09:15

## 2018-09-29 RX ADMIN — ENOXAPARIN SODIUM SCH MG: 40 INJECTION SUBCUTANEOUS at 09:27

## 2018-09-29 RX ADMIN — RAMIPRIL SCH MG: 5 CAPSULE ORAL at 09:26

## 2018-09-29 RX ADMIN — PIPERACILLIN AND TAZOBACTAM SCH MLS/HR: 4; .5 INJECTION, POWDER, LYOPHILIZED, FOR SOLUTION INTRAVENOUS at 09:27

## 2018-09-29 NOTE — PN
Physical Exam: 


SUBJECTIVE: Patient seen and examined,


Pt reports abdominal pain improved, but still c/o loose Bm's x3 this morning, 

no hematochezia,denies N/V,cp, sob or palpitations.











OBJECTIVE:





 Vital Signs











 Period  Temp  Pulse  Resp  BP Sys/Hernandez  Pulse Ox


 


 Last 24 Hr  98.2 F-99.0 F  70-74  18-21  124-147/76-86  











GENERAL: The patient is awake, alert, and fully oriented, in no acute distress.


HEAD: Normal with no signs of trauma.


EYES: PERRL, extraocular movements intact, sclera anicteric, conjunctiva clear. 

No ptosis. 


ENT: Ears normal, nares patent, oropharynx clear without exudates, moist mucous 


membranes.


NECK: Trachea midline, full range of motion, supple. 


LUNGS: Breath sounds equal, clear to auscultation bilaterally, no wheezes, no 

crackles, no 


accessory muscle use. 


HEART: Regular rate and rhythm, S1, S2 without murmur, rub or gallop.


ABDOMEN: Soft,  mild LLQ tenderness,non-distended, normoactive bowel sounds, no 

guarding, no 


rebound, no hepatosplenomegaly, no masses.


EXTREMITIES: 2+ pulses, warm, well-perfused, no edema. 


NEUROLOGICAL: Cranial nerves II through XII grossly intact. Normal speech, gait 

not 


observed.


PSYCH: Normal mood, normal affect.


SKIN: Warm, dry, normal turgor, no rashes or lesions noted














 Laboratory Results - last 24 hr











  09/27/18 09/29/18 09/29/18





  08:00 07:38 07:38


 


WBC   6.9 


 


RBC   3.78 


 


Hgb   11.3 


 


Hct   34.5 


 


MCV   91.2 


 


MCH   30.0 


 


MCHC   32.9 


 


RDW   12.0 


 


Plt Count   230 


 


MPV   7.8 


 


Absolute Neuts (auto)   4.4 


 


Neutrophils %   64.2 


 


Lymphocytes %   24.1 


 


Monocytes %   8.0 


 


Eosinophils %   3.2 


 


Basophils %   0.5 


 


Sodium    138


 


Potassium    3.6


 


Chloride    108 H


 


Carbon Dioxide    22


 


Anion Gap    8


 


BUN    7


 


Creatinine    0.7


 


Creat Clearance w eGFR    > 60


 


Random Glucose    97


 


Calcium    8.7


 


Phosphorus    2.5  D


 


Magnesium    1.9


 


Stool O & P Wet Mount    


 


O & P Permanent Slide  Final report  








Active Medications











Generic Name Dose Route Start Last Admin





  Trade Name Freq  PRN Reason Stop Dose Admin


 


Acetaminophen  1,000 mg  09/27/18 17:28  09/28/18 20:42





  Ofirmev Injection -  IVPB   1,000 mg





  Q6H PRN   Administration





  FEVER   





     





     





     


 


Enoxaparin Sodium  40 mg  09/29/18 10:00  09/29/18 09:27





  Lovenox -  SQ   40 mg





  DAILY ERLIN   Administration





     





     





     





     


 


Potassium Chloride/Sodium Chloride  20 meq in 1,000 mls @ 83 mls/hr  09/27/18 08

:30  09/29/18 09:15





  Ns+20 Meq Kcl -  IV   83 mls/hr





  ASDIR ERLIN   Administration





     





     





     





     


 


Metronidazole  500 mg in 100 mls @ 100 mls/hr  09/27/18 10:00  09/29/18 09:45





  Flagyl 500mg Premixed Ivpb -  IVPB   100 mls/hr





  Q8H-IV ERLIN   Administration





     





     





     





     


 


Famotidine/Sodium Chloride  20 mg in 50 mls @ 100 mls/hr  09/28/18 10:00  09/29/ 18 10:50





  Pepcid 20 Mg Premixed Ivpb -  IVPB   100 mls/hr





  BID ERLIN   Administration





     





     





     





     


 


Melatonin  5 mg  09/28/18 23:47  





  Melatonin  PO   





  HS PRN   





  INSOMNIA   





     





     





     


 


Ramipril  5 mg  09/28/18 10:00  09/29/18 09:26





  Altace -  PO   5 mg





  DAILY ERLIN   Administration





     





     





     





     


 


Ramipril  10 mg  09/27/18 22:00  09/28/18 21:41





  Altace -  PO   10 mg





  HS ERLIN   Administration





     





     





     





     


 


Vancomycin HCl  125 mg  09/28/18 18:00  09/29/18 12:17





  Vancomycin Oral Solution  PO   125 mg





  Q6HPO ERLIN   Administration





     





     





     





     














 Microbiology





09/27/18 08:00   Stool   Salmonella/Shigella Culture - Final


                            NO GROWTH OF SALMONELLA OR SHIGELLA SPECIES OBTAINED


09/27/18 08:00   Stool   Campylobacter Culture - Final


                            NO GROWTH OF CAMPYLOBACTER SPECIES OBTAINED


09/27/18 08:00   Stool   Yersinia Culture - Final


                            NO GROWTH OF YERSINIA SPECIES OBTAINED


09/27/18 08:00   Stool   Vibrio Culture - Final


                            NO GROWTH OF VIBRIO SPECIES OBTAINED


09/27/18 08:00   Stool   Escherichia coli 0157 Culture - Final


                            NO GROWTH OF E COLI 0157 OBTAINED


09/26/18 16:11   Blood - Peripheral Venous   Blood Culture - Preliminary


                            NO GROWTH OBTAINED AFTER 48 HOURS, INCUBATION TO 

CONTINUE


                            FOR 3 DAYS.


09/26/18 16:11   Blood - Peripheral Venous   Blood Culture - Preliminary


                            NO GROWTH OBTAINED AFTER 48 HOURS, INCUBATION TO 

CONTINUE


                            FOR 3 DAYS.


09/27/18 08:00   Stool   Clostridium difficile Antigen (RANDY) - Final- Positive 


09/26/18 17:30   Urine - Urine Clean Catch   Urine Culture - Final


                            NO GROWTH OBTAINED


09/26/18 18:53   Nasopharyngeal Swab   Influenza Types A,B Antigen -Neg 





CT abd/pelvis with IV contrast: Thickening of the ascending and most of the 

transverse colon wall compatible colitis. There is mild mesenteric stranding 

around the cecum. There is also suggestion of mild thickening of the terminal 

ileum wall., Infectious versus inflammatory process. Diverticulosis coli mainly 

in the distal descending and sigmoid colon without evidence of acute 

diverticulitis. 








ASSESSMENT/PLAN:


Patient is a 58-year-old female with a past medical history diverticulosis and 

hypertension, admitted with recurrent colitis.





*Colitis- likely infectious versus inflammatory process


- C- Diff positive


- ID following , d/c'd Zosyn


- will cont on Oral vanco and Flagyl


- s/p colonoscopy by Dr. Gomez,verbal report - resolving colitis, colonic 

erythema


-  Diet advanced to regular, will cont on  low residue, lactose free diet 


-remains afebrile,leukocytosis normalized  


- BC - no growth





*Hypertension-blood pressure at goal


-will continue home dose of Ramipril


- strict b/p monitoring q4h 





* Hypokalemia - likely due to diarrhea 


- s/p replacement


-K 3.6 today


- Mg  level stable 








* Gastritis: Will cont on Pepcid





*f/e/n


 Diet : low residue, lactose free diet


- will cont on IVF


- replace electrolyte as needed








*DVT PPX:Lovenox 








Dispo: Pt currently requires in pt treatment.





Visit type





- Emergency Visit


Emergency Visit: Yes


ED Registration Date: 09/28/18


Care time: The patient presented to the Emergency Department on the above date 

and was hospitalized for further evaluation of their emergent condition.





- New Patient


This patient is new to me today: Yes


Date on this admission: 09/29/18





- Critical Care


Critical Care patient: No

## 2018-09-29 NOTE — PN
Progress Note, Physician


History of Present Illness: 


Feeling better


No c/o abdominal pain


Reports 3 loose BMs today


No N/V


Temps down   Afebrile 


WBC improved   WNL


Stool cultures negative 





- Current Medication List


Current Medications: 


Active Medications





Acetaminophen (Ofirmev Injection -)  1,000 mg IVPB Q6H PRN


   PRN Reason: FEVER


   Last Admin: 09/28/18 20:42 Dose:  1,000 mg


Enoxaparin Sodium (Lovenox -)  40 mg SQ DAILY [facility]


   Last Admin: 09/29/18 09:27 Dose:  40 mg


Potassium Chloride/Sodium Chloride (Ns+20 Meq Kcl -)  20 meq in 1,000 mls @ 83 

mls/hr IV ASDIR [facility]


   Last Admin: 09/29/18 09:15 Dose:  83 mls/hr


Metronidazole (Flagyl 500mg Premixed Ivpb -)  500 mg in 100 mls @ 100 mls/hr 

IVPB Q8H-IV [facility]


   Last Admin: 09/29/18 18:27 Dose:  100 mls/hr


Famotidine/Sodium Chloride (Pepcid 20 Mg Premixed Ivpb -)  20 mg in 50 mls @ 

100 mls/hr IVPB BID [facility]


   Last Admin: 09/29/18 10:50 Dose:  100 mls/hr


Lactobacillus Acidophilus (Bacid -)  1 tab PO DAILY [facility]


   Last Admin: 09/29/18 14:15 Dose:  1 tab


Melatonin (Melatonin)  5 mg PO HS PRN


   PRN Reason: INSOMNIA


Miconazole Nitrate (Monistat-7 Vaginal Suppository -)  100 mg PV HS [facility]


Ramipril (Altace -)  5 mg PO DAILY [facility]


   Last Admin: 09/29/18 09:26 Dose:  5 mg


Ramipril (Altace -)  10 mg PO HS [facility]


   Last Admin: 09/28/18 21:41 Dose:  10 mg


Vancomycin HCl (Vancomycin Oral Solution)  125 mg PO Q6HPO [facility]


   Last Admin: 09/29/18 18:27 Dose:  125 mg











- Objective


Vital Signs: 


 Vital Signs











Temperature  98.2 F   09/29/18 14:00


 


Pulse Rate  67   09/29/18 14:00


 


Respiratory Rate  18   09/29/18 14:00


 


Blood Pressure  137/82   09/29/18 14:00


 


O2 Sat by Pulse Oximetry (%)  99   09/29/18 14:00











Constitutional: Yes: No Distress


Eyes: Yes: Conjunctiva Clear


Cardiovascular: Yes: Regular Rate and Rhythm, S1, S2


Respiratory: Yes: CTA Bilaterally


Gastrointestinal: Yes: Normal Bowel Sounds, Soft, Other (non tender)


Edema: No


Labs: 


 CBC, BMP





 09/29/18 07:38 





 09/29/18 07:38 











Assessment/Plan





Colitis   + C difficile


Stool cultures  negative


Endoscopic findings discussed with GI





OK for discharge in am on vancomycin 125mg po qid to 


   complete 14d course

## 2018-09-30 VITALS — HEART RATE: 70 BPM | SYSTOLIC BLOOD PRESSURE: 157 MMHG | DIASTOLIC BLOOD PRESSURE: 99 MMHG | TEMPERATURE: 97.9 F

## 2018-09-30 RX ADMIN — FAMOTIDINE SCH MLS/HR: 20 INJECTION, SOLUTION INTRAVENOUS at 09:17

## 2018-09-30 RX ADMIN — ENOXAPARIN SODIUM SCH: 40 INJECTION SUBCUTANEOUS at 09:18

## 2018-09-30 RX ADMIN — RAMIPRIL SCH MG: 5 CAPSULE ORAL at 09:17

## 2018-09-30 RX ADMIN — VANCOMYCIN HYDROCHLORIDE SCH MG: 250 CAPSULE ORAL at 05:38

## 2018-09-30 RX ADMIN — Medication SCH TAB: at 09:17

## 2018-09-30 NOTE — DS
Physical Exam: 


SUBJECTIVE: Patient seen and examined.


 Pt reports feeling better,denies abdominal pain, N/V/D, fever, chills, cp, sob 

or palpitations.








OBJECTIVE:





 Vital Signs











 Period  Temp  Pulse  Resp  BP Sys/Hernandez  Pulse Ox


 


 Last 24 Hr  97.9 F-98.8 F  67-71  18-18  137-157/77-99  97-99








PHYSICAL EXAM





GENERAL: The patient is awake, alert, and fully oriented, in no acute distress.


HEAD: Normal with no signs of trauma.


EYES: PERRL, extraocular movements intact, sclera anicteric, conjunctiva clear. 


ENT: Ears normal, nares patent, oropharynx clear without exudates, moist mucous 

membranes.


NECK: Trachea midline, full range of motion, supple. 


LUNGS: Breath sounds equal, clear to auscultation bilaterally, no wheezes, no 

crackles, no accessory muscle use. 


HEART: Regular rate and rhythm, S1, S2 without murmur, rub or gallop.


ABDOMEN: Soft, nontender, nondistended, normoactive bowel sounds, no guarding, 

no rebound, no hepatosplenomegaly, no masses.


EXTREMITIES: 2+ pulses, warm, well-perfused, no edema. 


NEUROLOGICAL: Cranial nerves II through XII grossly intact. Normal speech, gait 

not observed.


PSYCH: Normal mood, normal affect.


SKIN: Warm, dry, normal turgor, no rashes or lesions noted.





LABS


 Laboratory Results - last 24 hr











  09/29/18





  07:38


 


TSH  2.32











09/27/18 08:00   Stool   Salmonella/Shigella Culture - Final


                            NO GROWTH OF SALMONELLA OR SHIGELLA SPECIES OBTAINED


09/27/18 08:00   Stool   Campylobacter Culture - Final


                            NO GROWTH OF CAMPYLOBACTER SPECIES OBTAINED


09/27/18 08:00   Stool   Yersinia Culture - Final


                            NO GROWTH OF YERSINIA SPECIES OBTAINED


09/27/18 08:00   Stool   Vibrio Culture - Final


                            NO GROWTH OF VIBRIO SPECIES OBTAINED


09/27/18 08:00   Stool   Escherichia coli 0157 Culture - Final


                            NO GROWTH OF E COLI 0157 OBTAINED


09/26/18 16:11   Blood - Peripheral Venous   Blood Culture - Preliminary


                            NO GROWTH OBTAINED AFTER 48 HOURS, INCUBATION TO 

CONTINUE


                            FOR 3 DAYS.


09/26/18 16:11   Blood - Peripheral Venous   Blood Culture - Preliminary


                            NO GROWTH OBTAINED AFTER 48 HOURS, INCUBATION TO 

CONTINUE


                            FOR 3 DAYS.


09/27/18 08:00   Stool   Clostridium difficile Antigen (RANDY) - Final- Positive 


09/26/18 17:30   Urine - Urine Clean Catch   Urine Culture - Final


                            NO GROWTH OBTAINED


09/26/18 18:53   Nasopharyngeal Swab   Influenza Types A,B Antigen -Neg 





CT abd/pelvis with IV contrast: Thickening of the ascending and most of the 

transverse colon wall compatible colitis. There is mild mesenteric stranding 

around the cecum. There is also suggestion of mild thickening of the terminal 

ileum wall., Infectious versus inflammatory process. Diverticulosis coli mainly 

in the distal descending and sigmoid colon without evidence of acute 

diverticulitis. 











HOSPITAL COURSE:





Date of Admission:09/28/18





Date of Discharge: 09/30/18








ASSESSMENT/PLAN:


Patient is a 58-year-old female with a past medical history diverticulosis and 

hypertension, admitted with recurrent colitis. Initially, pt received 


Zosyn, Flagyl and  oral Vanco. Underwent colonoscopy by Dr. Gomez,reports 

resolving colitis and colonic erythema. Low residue /lactose free diet 

tolerated well. Stool study ruled in for C-Diff,pt was evaluated by ID Dr. Leigh

, rec to DC pt on oral Vanco to complete the course. Pt remains clinically 

stable, afebrile and leukocytosis normalized, stool studies negative.





*Hypertension-blood pressure at goal,will continue home dose of Ramipril.





* Hypokalemia - likely due to diarrhea, s/p replacement, Rpt K 3.6.





* Gastritis: Will continue on Pepcid








Minutes to complete discharge: 40





Discharge Summary


Reason For Visit: COLITIS


Condition: Good





- Instructions


Diet, Activity, Other Instructions: 


 Low fiber, lactose free diet.


 Informed to complete the full course of Vancomycin.


Referrals: 


Miguel Benjamin MD [Primary Care Provider] - 2 Weeks


Madison Gomez DO [Staff Physician] -  (1-2 weeks)


Disposition: HOME





- Home Medications


Comprehensive Discharge Medication List: 


Ambulatory Orders





Ramipril 10 mg PO HS 09/10/18 


Famotidine [Pepcid] 40 mg PO DAILY #30 tablet 09/30/18 


Lactobacillus Acidophilus [Bacid -] 1 tab PO DAILY  tab 09/30/18 


Melatonin 5 mg PO HS PRN  tab 09/30/18 


Vancomycin Oral Solution 250 mg PO Q6H 8 Days #100 ml 09/30/18 








This patient is new to me today: No


Emergency Visit: Yes


ED Registration Date: 09/28/18


Care time: The patient presented to the Emergency Department on the above date 

and was hospitalized for further evaluation of their emergent condition.


Critical Care patient: No





- Discharge Referral


Referred to [facility] No

## 2018-10-04 NOTE — PATH
Surgical Pathology Report



Patient Name:  BRITTANI AJ

Accession #:  [id number]

[facility]. Rec. #:  [id number]                                                        

   /Age/Gender:  1960 (Age: 58) / F

Account:  [id number]                                                          

             Location: [facility]

Taken:  2018

Received:  2018

Reported:  10/4/2018

Physicians:  Suzette Thrasher M.D.

  



Specimen(s) Received

A: BX CECUM R/O CMV COLITIS 

B: RIGHT COLON BIOPSY R/O CMV COLITIS 

C: TRANSVERSE COLON 

D: TRANSVERSE COLON BIOPSY #2 DISTAL 

E: DESCENDING COLON BIOPSY 

F: BX SIGMOID 

G: RECTUM 





Clinical History

Colitis







Final Diagnosis

A. CECUM BIOPSY, RULE OUT CMV COLITIS:

COLONIC MUCOSA WITH MODERATELY ACUTE INFLAMMATION IN THE LAMINA PROPRIA AND

ACUTE CRYPTITIS.



B. RIGHT COLON BIOPSY, RULE OUT CMV COLITIS:

COLONIC MUCOSA WITH MODERATELY ACUTE INFLAMMATION IN THE LAMINA PROPRIA, ACUTE

CRYPTITIS, AND CRYPT ABSCESS.



C. TRANSVERSE COLON, BIOPSY:

COLONIC MUCOSA WITH MODERATELY ACUTE INFLAMMATION IN THE LAMINA PROPRIA, ACUTE

CRYPTITIS, AND CRYPT ABSCESS.



D. TRANSVERSE COLON #2, DISTAL, BIOPSY:

COLONIC MUCOSA WITH MODERATELY ACUTE INFLAMMATION IN THE LAMINA PROPRIA AND

ACUTE CRYPTITIS.



E. DESCENDING COLON, BIOPSY:

COLONIC MUCOSA WITH MODERATELY ACUTE INFLAMMATION IN THE LAMINA PROPRIA AND

ACUTE CRYPTITIS.



F. SIGMOID, BIOPSY:

COLONIC MUCOSA WITH MODERATELY ACUTE INFLAMMATION IN THE LAMINA PROPRIA AND

ACUTE CRYPTITIS.



G. RECTUM, BIOPSY:

COLONIC MUCOSA WITH MODERATELY ACUTE INFLAMMATION IN THE LAMINA PROPRIA AND

ACUTE CRYPTITIS.



Comment: Moderately acute inflammation seen in all biopsy specimens. No viral

inclusion or parasites identified. No granulomatous inflammation. Architectural

features of chronicity are not present.  Immunostain done on blocks "A" and "B"

is negative for CMV. Clinical correlation with microbiologic culture study

results is recommended. 

Immunohistochemistry stain CMV performed at [place], [place]

([id number]) interpreted at [facility].







***Electronically Signed***

Bonnie Hirsch M.D.





Gross Description

A.  Received in formalin, labeled "cecum biopsy" is a tan, irregular portion of

soft tissue measuring 0.3 cm. in greatest dimension. The specimens are submitted

in toto in one cassette.



B.  Received in formalin, labeled "right colon biopsy" is a tan, irregular

portion of soft tissue measuring 0.3 cm. in greatest dimension. The specimens

are submitted in toto in one cassette.



C.  Received in formalin, labeled "transverse colon biopsy" is a tan, irregular

portion of soft tissue measuring 0.3 cm. in greatest dimension. The specimens

are submitted in toto in one cassette.



D.  Received in formalin, labeled "transcondylar #2 distal" is a tan, irregular

portion of soft tissue measuring 0.3 cm. in greatest dimension. The specimens

are submitted in toto in one cassette.



E.  Received in formalin, labeled "descending colon biopsy" is a tan, irregular

portion of soft tissue measuring 0.4 cm. in greatest dimension. The specimens

are submitted in toto in one cassette.



F.  Received in formalin, labeled "sigmoid biopsy" is a tan, irregular portion

of soft tissue measuring 0.3 cm. in greatest dimension. The specimens are

submitted in toto in one cassette.



G.  Received in formalin, labeled "rectal biopsy" is a tan, irregular portions

of soft tissue measuring 0.3 cm. in greatest dimension. The specimens are

submitted in toto in one cassette.

__

OFELIA/10/1/2018



gonzalo/10/1/2018

## 2018-10-25 LAB — 5-HYDROXYINDOLEACETATE [MOLES/VOLUME] IN SERUM OR PLASMA: 3.7 NMOL/L

## 2019-02-12 ENCOUNTER — HOSPITAL ENCOUNTER (EMERGENCY)
Dept: HOSPITAL 74 - JERFT | Age: 59
Discharge: HOME | End: 2019-02-12
Payer: COMMERCIAL

## 2019-02-12 VITALS — BODY MASS INDEX: 30.4 KG/M2

## 2019-02-12 VITALS — HEART RATE: 86 BPM | SYSTOLIC BLOOD PRESSURE: 129 MMHG | DIASTOLIC BLOOD PRESSURE: 89 MMHG | TEMPERATURE: 97.8 F
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
## 2019-02-12 DIAGNOSIS — M25.561: ICD-10-CM
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
## 2019-02-12 DIAGNOSIS — E78.00: ICD-10-CM
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
## 2019-02-12 DIAGNOSIS — S93.402A: Primary | ICD-10-CM

## 2019-02-12 DIAGNOSIS — S00.83XA: ICD-10-CM

## 2019-02-12 DIAGNOSIS — Z87.19: ICD-10-CM

## 2019-02-12 DIAGNOSIS — I10: ICD-10-CM

## 2019-02-12 DIAGNOSIS — M79.641: ICD-10-CM

## 2019-02-12 PROCEDURE — 2W3RX1Z IMMOBILIZATION OF LEFT LOWER LEG USING SPLINT: ICD-10-PCS

## 2019-02-12 NOTE — PDOC
Rapid Medical Evaluation


Time Seen by Provider: 02/12/19 15:43


Medical Evaluation: 


 Allergies











Allergy/AdvReac Type Severity Reaction Status Date / Time


 


linaclotide [From Linzess] AdvReac Intermediate diarrhea Verified 09/26/18 15:29











02/12/19 15:43


Pt presents to the ED after slipping and falling in the lobby upstairs on 

water. Pt is an employee. Has L ankle pain and R lower knee pain. Also hit chin 

and fell on L hand. 


Exam: TTP of the L lateral ankle and R proximal tibia. Bruising to L palm 


Orders: X-ray


Pt to proceed to ED for further evaluation











**Discharge Disposition





- Diagnosis


 Fall








- Referrals





- Patient Instructions





- Post Discharge Activity

## 2019-02-12 NOTE — PDOC
History of Present Illness





- General


Chief Complaint: Injury


Stated Complaint: FALL


Time Seen by Provider: 19 15:43


History Source: Patient, Care Provider


Exam Limitations: No Limitations





- History of Present Illness


Initial Comments: 





19 16:39


While walking across lobby at [facility], slipped on wet floor twisting her 

left ankle and falling forward onto her right knee, right hand, and striking 

chin on the floor. There was no LOC, no dental injury, states pain is primarily 

her left ankle, right knee right hand and minor tenderness to her chin.


Occurred: reports: just prior to arrival


Severity: reports: mild, moderate


Pain Location: reports: face, lower extremity, upper extremity


Method of Injury: Yes: fall


Modifying Factors: improves with: None


Loss of Consciousness: no loss of consciousness


Associated Symptoms (Fall): denies symptoms





Past History





- Travel


Traveled outside of the country in the last 30 days: No


Close contact w/someone who was outside of country & ill: No





- Past Medical History


Allergies/Adverse Reactions: 


 Allergies











Allergy/AdvReac Type Severity Reaction Status Date / Time


 


linaclotide [From Linzess] AdvReac Intermediate diarrhea Verified 19 15:44











Home Medications: 


Ambulatory Orders





Ramipril 10 mg PO HS 09/10/18 


Cyclobenzaprine HCl 10 mg PO Q8H PRN #14 tablet 19 


Naproxen [Naprosyn -] 500 mg PO BID #30 tablet 19 








Anemia: No


Asthma: No


Cancer: No


Cardiac Disorders: No


CVA: No


COPD: No


CHF: No


DVT: No


Dementia: No


Diabetes: No


GI Disorders: Yes (IBS, GERD)


 Disorders: No


HTN: Yes (ON MEDICATION)


Hypercholesterolemia: Yes


Liver Disease: No


Seizures: No


Thyroid Disease: No





- Surgical History


Abdominal Surgery: Yes ( 2)


Appendectomy: No


Cardiac Surgery: No


Cholecystectomy: No


Lung Surgery: No


Neurologic Surgery: No


Orthopedic Surgery: No





- Suicide/Smoking/Psychosocial Hx


Smoking History: Never smoked


Have you smoked in the past 12 months: No


Hx Alcohol Use: Yes


Drug/Substance Use Hx: No


Substance Use Type: Alcohol


Hx Substance Use Treatment: No





**Review of Systems





- Review of Systems


Able to Perform ROS?: Yes


Is the patient limited English proficient: Yes


Constitutional: Yes: Symptoms Reported, See HPI.  No: Fever, Loss of Appetite, 

Malaise


HEENTM: Yes: See HPI.  No: Symptoms Reported


Respiratory: No: Symptoms reported


: No: Symptoms Reported


Musculoskeletal: Yes: Symptoms Reported, See HPI, Joint Pain (left ankle )


Integumentary: Yes: Symptoms Reported, See HPI, Bruising


Neurological: Yes: See HPI.  No: Symptoms reported


All Other Systems: Reviewed and Negative





*Physical Exam





- Vital Signs


 Last Vital Signs











Temp Pulse Resp BP Pulse Ox


 


 97.8 F   86   17   129/89   98 


 


 19 15:44  19 15:44  19 15:44  19 15:44  19 15:44














- Physical Exam


General Appearance: Yes: Nourished, Appropriately Dressed, Apparent Distress, 

Mild Distress


HEENT: positive: AGENS, Normal ENT Inspection, TMs Normal, Pharynx Normal, Other 

(mild superficial contusion to chin, no crepitus or step-off, has full range of 

motion at TMJ. No dental injury.).  negative: Rhinorrhea


Neck: positive: Supple, Other (has some mild muscular tenderness bilateral 

paravertebral spinous muscles, but no true C-spine tenderness crepitus or step-

offs.).  negative: Tender


Musculoskeletal: positive: Normal Inspection.  negative: CVA Tenderness, 

Vertebral Tenderness


Extremity: positive: Normal Capillary Refill, Normal Inspection, Normal Range 

of Motion (able to flex and extend left and right wrists. Has mild tenderness 

at the thenar eminence without crepitus or step-offs, strong grasp flexion and 

extension to all digits, neurovascular intact to fingertips. Wrist is intact 

without tenderness to pronation supination and abduction.), Swelling (

tenderness and contusion noted inferior and prepatellar area of right knee. No 

crepitus or step-offs, range of motion is intact, no medial or lateral 

tenderness, no posterior fossa tenderness, strong contraction to quads and 

hamstrings. Is ambulatory with minimal unsteadiness or limp. Neurovascular 

intact to foot.), Other (left ankle with no point tenderness to medial or 

lateral malleolus, fifth metatarsal or navicular bones. Negative squeeze test. 

Neurovascular intact to toes. Has soft tissue swelling posterior to lateral 

malleolus in the ligamentous areas without crepitus or step-offs. Neurovascular 

intact to toes)


Integumentary: positive: Normal Color.  negative: Bruising


Neurologic: positive: CNs II-XII NML intact, Fully Oriented, Alert, Normal Mood/

Affect, Normal Response, Motor Strength 5/5





Moderate Sedation





- Procedure Monitoring


Vital Signs: 


Procedure Monitoring Vital Signs











Temperature  97.8 F   19 15:44


 


Pulse Rate  86   19 15:44


 


Respiratory Rate  17   19 15:44


 


Blood Pressure  129/89   19 15:44


 


O2 Sat by Pulse Oximetry (%)  98   19 15:44











Progress Note





- Progress Note


Progress Note: 





X-rays negative for fractures or dislocations, of left ankle and right wrist. We

'll treat with NSAIDs, and cyclobenzaprine for any whiplash type spasm. Ice 

packs and NABOR E for contusions and sprains, and will follow-up with Dr. Correia 

or so as needed this week. Ace, Aircast placed to left ankle





*DC/Admit/Observation/Transfer


Diagnosis at time of Disposition: 


Fall


Qualifiers:


 Encounter type: initial encounter Qualified Code(s): W19.XXXA - Unspecified 

fall, initial encounter





Left ankle sprain


Qualifiers:


 Encounter type: initial encounter Involved ligament of ankle: unspecified 

ligament Qualified Code(s): S93.402A - Sprain of unspecified ligament of left 

ankle, initial encounter








- Discharge Dispostion


Disposition: HOME


Condition at time of disposition: Stable


Decision to Admit order: No





- Referrals


Referrals: 


Que Correia DO [Staff Physician] - 





- Patient Instructions


Printed Discharge Instructions:  DI for Ankle Sprain, DI for Contusion


Additional Instructions: 


Rest, ice to area on and off for 15 minutes 4-6 times a day


Avoid heavy lifting or exercise until pain and swelling is resolved or until 

further directed


Keep area highly elevated to reduce swelling


Use splints/Ace wrap as directed


Followup with orthopedist in one to 2 days if not improving, 


    if significantly improved may wait one week for followup with orthopedist





May use ibuprofen every 6 hours as needed for pain





- Post Discharge Activity

## 2020-02-02 ENCOUNTER — HOSPITAL ENCOUNTER (EMERGENCY)
Dept: HOSPITAL 74 - FER | Age: 60
Discharge: HOME | End: 2020-02-02
Payer: COMMERCIAL

## 2020-02-02 VITALS — DIASTOLIC BLOOD PRESSURE: 95 MMHG | SYSTOLIC BLOOD PRESSURE: 148 MMHG | TEMPERATURE: 97.8 F | HEART RATE: 74 BPM

## 2020-02-02 VITALS — BODY MASS INDEX: 29.9 KG/M2

## 2020-02-02 DIAGNOSIS — E78.00: ICD-10-CM

## 2020-02-02 DIAGNOSIS — J40: Primary | ICD-10-CM

## 2020-02-02 DIAGNOSIS — I10: ICD-10-CM

## 2020-02-02 DIAGNOSIS — Z88.8: ICD-10-CM

## 2020-02-02 DIAGNOSIS — K21.9: ICD-10-CM

## 2020-02-02 NOTE — PDOC
History of Present Illness





- General


Chief Complaint: Respiratory


Stated Complaint: COUGH & COLD SX


Time Seen by Provider: 20 12:17


History Source: Patient


Exam Limitations: No Limitations





- History of Present Illness


Initial Comments: 





20 12:19


60 y/o female with cough and congestion with fever since Thursday. Placed on Z-

pack started on Thursday night. No chest pain or SOB. No N/V/d/C. Hx of 

pneumonia in the past. Denies traveling, but cough is getting worse. No hx of 

asthma or smoking.


Is this a multiple visit Asthma Patient?: No





Past History





- Past Medical History


Allergies/Adverse Reactions: 


 Allergies











Allergy/AdvReac Type Severity Reaction Status Date / Time


 


linaclotide [From Linzess] AdvReac Intermediate diarrhea Verified 20 12:15











Home Medications: 


Ambulatory Orders





Azithromycin 250 mg PO DAILY 20 


Carvedilol [Coreg] 6.25 mg PO BID 20 


Furosemide 20 mg PO DAILY 20 


Ibuprofen [Advil -] 400 mg PO ONCE 20 


predniSONE [Deltasone -] 20 mg PO BID #10 tablet 20 








Anemia: No


Asthma: No


Cancer: No


Cardiac Disorders: No


CVA: No


COPD: No


CHF: No


DVT: No


Dementia: No


Diabetes: No


GI Disorders: Yes (IBS, GERD)


 Disorders: No


HTN: Yes (ON MEDICATION)


Hypercholesterolemia: Yes


Liver Disease: No


Seizures: No


Thyroid Disease: No





- Surgical History


Abdominal Surgery: Yes ( 2)


Appendectomy: No


Cardiac Surgery: No


Cholecystectomy: No


Lung Surgery: No


Neurologic Surgery: No


Orthopedic Surgery: No





- Psycho Social/Smoking Cessation Hx


Smoking History: Never smoked


Have you smoked in the past 12 months: No


Hx Alcohol Use: Yes


Drug/Substance Use Hx: No


Substance Use Type: Alcohol


Hx Substance Use Treatment: No





**Review of Systems





- Review of Systems


Able to Perform ROS?: Yes


Is the patient limited English proficient: No


Constitutional: No: Chills, Fever


Respiratory: Yes: Cough.  No: Shortness of Breath


Cardiac (ROS): No: Chest Pain


Musculoskeletal: No: Back Pain


All Other Systems: Reviewed and Negative





*Physical Exam





- Physical Exam


General Appearance: Yes: Nourished, Appropriately Dressed.  No: Apparent 

Distress


HEENT: positive: EOMI, AGNES, Normal ENT Inspection, Normal Voice, Symmetrical, 

Pharynx Normal


Neck: positive: Trachea midline, Normal Thyroid, Supple.  negative: Tender, 

Rigid


Respiratory/Chest: positive: Lungs Clear.  negative: Chest Tender, Normal 

Breath Sounds (coarse breath sounds b/l, no wheezing), Respiratory Distress


Cardiovascular: positive: Regular Rhythm, Regular Rate, S1, S2.  negative: Edema

, JVD, Murmur


Gastrointestinal/Abdominal: positive: Normal Bowel Sounds, Flat, Soft.  negative

: Tender


Lymphatic: negative: Adenopathy, Tenderness, Other


Musculoskeletal: positive: Normal Inspection.  negative: CVA Tenderness


Extremity: positive: Normal Capillary Refill, Normal Inspection, Normal Range 

of Motion.  negative: Calf Tenderness (no calf tenderness b/l)


Integumentary: positive: Normal Color, Dry, Warm


Neurologic: positive: CNs II-XII NML intact, Fully Oriented, Alert, Normal Mood/

Affect, Normal Response, Motor Strength 5/5





ED Progress Note





- Progress Note


Progress Note: 





20 12:21


bronchitis, hx of pneumonia will check CXR


Pt in agreement with plan


20 12:59





CXR NAD





Continue Z-pack


Will add Prednisone


If worsen return to ER





Pt in agreement with plan





Discharge





- Discharge Information


Problems reviewed: Yes


Clinical Impression/Diagnosis: 


 Bronchitis





Condition: Stable


Disposition: HOME





- Admission


No





- Follow up/Referral





- Patient Discharge Instructions


Patient Printed Discharge Instructions:  DI for Acute Bronchitis


Additional Instructions: 


Fluids, rest, Motrin


Continue Z-pack as directed


Prednisone 20 mg 2x/day for 5 days


If worsen return to ER





- Post Discharge Activity

## 2021-06-02 ENCOUNTER — HOSPITAL ENCOUNTER (OUTPATIENT)
Dept: HOSPITAL 74 - FASU-ENDO | Age: 61
Discharge: HOME | End: 2021-06-02
Attending: INTERNAL MEDICINE
Payer: COMMERCIAL

## 2021-06-02 VITALS — TEMPERATURE: 97.8 F

## 2021-06-02 VITALS — SYSTOLIC BLOOD PRESSURE: 131 MMHG | DIASTOLIC BLOOD PRESSURE: 75 MMHG | HEART RATE: 68 BPM

## 2021-06-02 VITALS — BODY MASS INDEX: 28.8 KG/M2

## 2021-06-02 DIAGNOSIS — Z12.11: Primary | ICD-10-CM

## 2021-06-02 DIAGNOSIS — K64.8: ICD-10-CM

## 2021-06-02 DIAGNOSIS — Z86.010: ICD-10-CM

## 2021-06-02 DIAGNOSIS — K57.30: ICD-10-CM

## 2021-06-02 DIAGNOSIS — K64.1: ICD-10-CM

## 2021-06-02 PROCEDURE — 0DJD8ZZ INSPECTION OF LOWER INTESTINAL TRACT, VIA NATURAL OR ARTIFICIAL OPENING ENDOSCOPIC: ICD-10-PCS | Performed by: INTERNAL MEDICINE

## 2021-07-01 ENCOUNTER — HOSPITAL ENCOUNTER (OUTPATIENT)
Dept: HOSPITAL 74 - FASU | Age: 61
Discharge: HOME | End: 2021-07-01
Attending: SURGERY
Payer: COMMERCIAL

## 2021-07-01 VITALS — TEMPERATURE: 97.7 F | HEART RATE: 73 BPM

## 2021-07-01 VITALS — SYSTOLIC BLOOD PRESSURE: 130 MMHG | DIASTOLIC BLOOD PRESSURE: 76 MMHG

## 2021-07-01 VITALS — BODY MASS INDEX: 28.8 KG/M2

## 2021-07-01 DIAGNOSIS — N62: Primary | ICD-10-CM

## 2021-07-01 PROCEDURE — 0HBV0ZZ EXCISION OF BILATERAL BREAST, OPEN APPROACH: ICD-10-PCS | Performed by: SURGERY
